# Patient Record
Sex: MALE | Race: WHITE | NOT HISPANIC OR LATINO | Employment: FULL TIME | URBAN - METROPOLITAN AREA
[De-identification: names, ages, dates, MRNs, and addresses within clinical notes are randomized per-mention and may not be internally consistent; named-entity substitution may affect disease eponyms.]

---

## 2019-11-02 ENCOUNTER — HOSPITAL ENCOUNTER (EMERGENCY)
Facility: HOSPITAL | Age: 54
Discharge: HOME | End: 2019-11-02
Attending: EMERGENCY MEDICINE | Admitting: EMERGENCY MEDICINE
Payer: COMMERCIAL

## 2019-11-02 VITALS
HEART RATE: 93 BPM | SYSTOLIC BLOOD PRESSURE: 157 MMHG | RESPIRATION RATE: 20 BRPM | DIASTOLIC BLOOD PRESSURE: 83 MMHG | TEMPERATURE: 97.3 F | WEIGHT: 226 LBS | OXYGEN SATURATION: 97 %

## 2019-11-02 DIAGNOSIS — S81.819A LEG LACERATION: Primary | ICD-10-CM

## 2019-11-02 PROCEDURE — 99282 EMERGENCY DEPT VISIT SF MDM: CPT

## 2019-11-02 PROCEDURE — 90715 TDAP VACCINE 7 YRS/> IM: CPT | Performed by: PHYSICIAN ASSISTANT

## 2019-11-02 PROCEDURE — 90471 IMMUNIZATION ADMIN: CPT

## 2019-11-02 RX ORDER — LIDOCAINE HYDROCHLORIDE AND EPINEPHRINE 10; 10 MG/ML; UG/ML
1 INJECTION, SOLUTION INFILTRATION; PERINEURAL ONCE
Status: COMPLETED | OUTPATIENT
Start: 2019-11-02 | End: 2019-11-02

## 2019-11-02 RX ADMIN — TETANUS TOXOID, REDUCED DIPHTHERIA TOXOID AND ACELLULAR PERTUSSIS VACCINE, ADSORBED 0.5 ML: 5; 2.5; 8; 8; 2.5 SUSPENSION INTRAMUSCULAR at 18:16

## 2019-11-02 RX ADMIN — LIDOCAINE HYDROCHLORIDE,EPINEPHRINE BITARTRATE 1 ML: 10; .01 INJECTION, SOLUTION INFILTRATION; PERINEURAL at 18:16

## 2019-11-02 NOTE — ED PROVIDER NOTES
History  Chief Complaint   Patient presents with    Extremity Laceration     cut to L leg on a issac tomatoe cage in his yard, thinks he needs a tetanus shot     The patient is a 71-year-old male who presents for evaluation left lower leg laceration  He states that he was in his garden and tripped over a issac tomato cage  Sustained a laceration to the leg  Is unsure of his tetanus vaccination status  Presents for evaluation and wound management  None       Past Medical History:   Diagnosis Date    Umbilical hernia        History reviewed  No pertinent surgical history  History reviewed  No pertinent family history  I have reviewed and agree with the history as documented  Social History     Tobacco Use    Smoking status: Never Smoker    Smokeless tobacco: Never Used   Substance Use Topics    Alcohol use: Yes     Comment: social 1-2 drinks a week    Drug use: Never        Review of Systems   Constitutional: Negative for activity change, appetite change and fatigue  HENT: Negative for nosebleeds, sneezing, sore throat, trouble swallowing and voice change  Eyes: Negative for photophobia, pain and visual disturbance  Respiratory: Negative for apnea, choking and stridor  Cardiovascular: Negative for palpitations and leg swelling  Gastrointestinal: Negative for anal bleeding and constipation  Endocrine: Negative for cold intolerance, heat intolerance, polydipsia and polyphagia  Genitourinary: Negative for decreased urine volume, enuresis, frequency, genital sores and urgency  Musculoskeletal: Negative for joint swelling and myalgias  Allergic/Immunologic: Negative for environmental allergies and food allergies  Neurological: Negative for tremors, seizures, speech difficulty and weakness  Hematological: Negative for adenopathy  Psychiatric/Behavioral: Negative for behavioral problems, decreased concentration, dysphoric mood and hallucinations     All other systems reviewed and are negative  Physical Exam  Physical Exam   Constitutional: He is oriented to person, place, and time  He appears well-developed and well-nourished  No distress  HENT:   Head: Normocephalic and atraumatic  Right Ear: External ear normal    Left Ear: External ear normal    Nose: Nose normal    Mouth/Throat: Oropharynx is clear and moist    Eyes: Pupils are equal, round, and reactive to light  Conjunctivae and EOM are normal    Neck: Normal range of motion  Neck supple  Cardiovascular: Normal rate, regular rhythm and normal heart sounds  Exam reveals no gallop and no friction rub  No murmur heard  Pulmonary/Chest: Effort normal and breath sounds normal  No respiratory distress  He has no wheezes  Abdominal: Soft  Bowel sounds are normal    Musculoskeletal: He exhibits tenderness  Legs:  Neurological: He is alert and oriented to person, place, and time  Skin: Skin is warm and dry  He is not diaphoretic  Psychiatric: He has a normal mood and affect  His behavior is normal    Vitals reviewed        Vital Signs  ED Triage Vitals [11/02/19 1747]   Temperature Pulse Respirations Blood Pressure SpO2   (!) 97 3 °F (36 3 °C) 93 20 157/83 97 %      Temp Source Heart Rate Source Patient Position - Orthostatic VS BP Location FiO2 (%)   Tympanic Monitor Sitting Right arm --      Pain Score       No Pain           Vitals:    11/02/19 1747   BP: 157/83   Pulse: 93   Patient Position - Orthostatic VS: Sitting         Visual Acuity      ED Medications  Medications   lidocaine-epinephrine (XYLOCAINE/EPINEPHRINE) 1 %-1:100,000 injection 1 mL (1 mL Infiltration Given 11/2/19 1816)   tetanus-diphtheria-acellular pertussis (BOOSTRIX) IM injection 0 5 mL (0 5 mL Intramuscular Given 11/2/19 1816)       Diagnostic Studies  Results Reviewed     None                 No orders to display              Procedures  Laceration repair  Date/Time: 11/2/2019 6:41 PM  Performed by: Ivet Reyes PA-C  Authorized by: Flower Rosario PA-C   Consent given by: patient  Patient identity confirmed: verbally with patient  Laceration length: 4 cm  Foreign bodies: no foreign bodies  Anesthesia: local infiltration    Anesthesia:  Local Anesthetic: lidocaine 1% with epinephrine    Wound Dehiscence:  Superficial Wound Dehiscence: simple closure      Procedure Details:  Irrigation solution: saline  Skin closure: 5-0 nylon  Number of sutures: 7  Technique: simple  Approximation: close  Approximation difficulty: simple  Patient tolerance: Patient tolerated the procedure well with no immediate complications             ED Course                               MDM    Disposition  Final diagnoses:   Leg laceration     Time reflects when diagnosis was documented in both MDM as applicable and the Disposition within this note     Time User Action Codes Description Comment    11/2/2019  6:41 PM Faizan Atkinson Add [[id number] 811E] Leg laceration       ED Disposition     ED Disposition Condition Date/Time Comment    Discharge Stable Sat Nov 2, 2019  6:41 PM Tamanna Wayne discharge to home/self care  Follow-up Information    None         Patient's Medications    No medications on file     No discharge procedures on file      ED Provider  Electronically Signed by           Flower Rosario PA-C  11/02/19 7943

## 2019-11-02 NOTE — ED NOTES
Pt wound irrigated with betadine and saline  Dabbed dry with gauze       Nathaly Kam RN  11/02/19 8861

## 2020-07-08 ENCOUNTER — HOSPITAL ENCOUNTER (OUTPATIENT)
Facility: HOSPITAL | Age: 55
Setting detail: OBSERVATION
Discharge: HOME | End: 2020-07-09
Attending: EMERGENCY MEDICINE | Admitting: INTERNAL MEDICINE
Payer: COMMERCIAL

## 2020-07-08 ENCOUNTER — APPOINTMENT (EMERGENCY)
Dept: RADIOLOGY | Facility: HOSPITAL | Age: 55
End: 2020-07-08
Payer: COMMERCIAL

## 2020-07-08 DIAGNOSIS — N20.0 KIDNEY STONE: Primary | ICD-10-CM

## 2020-07-08 DIAGNOSIS — N20.0 RIGHT NEPHROLITHIASIS: ICD-10-CM

## 2020-07-08 PROBLEM — R16.1 SPLENOMEGALY: Status: ACTIVE | Noted: 2019-09-24

## 2020-07-08 PROBLEM — K75.81 NONALCOHOLIC STEATOHEPATITIS: Status: ACTIVE | Noted: 2019-09-24

## 2020-07-08 PROBLEM — D69.6 THROMBOCYTOPENIC DISORDER (HCC): Status: ACTIVE | Noted: 2019-09-24

## 2020-07-08 PROBLEM — R31.9 HEMATURIA: Status: ACTIVE | Noted: 2020-07-08

## 2020-07-08 PROBLEM — I10 ESSENTIAL HYPERTENSION: Status: ACTIVE | Noted: 2020-07-08

## 2020-07-08 LAB
ALBUMIN SERPL BCP-MCNC: 3.9 G/DL (ref 3.5–5)
ALP SERPL-CCNC: 77 U/L (ref 46–116)
ALT SERPL W P-5'-P-CCNC: 39 U/L (ref 12–78)
ANION GAP SERPL CALCULATED.3IONS-SCNC: 8 MMOL/L (ref 4–13)
APTT PPP: 29 SECONDS (ref 23–37)
AST SERPL W P-5'-P-CCNC: 22 U/L (ref 5–45)
BACTERIA UR QL AUTO: ABNORMAL /HPF
BASOPHILS # BLD AUTO: 0.06 THOUSANDS/ΜL (ref 0–0.1)
BASOPHILS NFR BLD AUTO: 1 % (ref 0–1)
BILIRUB SERPL-MCNC: 0.5 MG/DL (ref 0.2–1)
BILIRUB UR QL STRIP: NEGATIVE
BUN SERPL-MCNC: 17 MG/DL (ref 5–25)
CALCIUM SERPL-MCNC: 9.2 MG/DL (ref 8.3–10.1)
CHLORIDE SERPL-SCNC: 102 MMOL/L (ref 100–108)
CLARITY UR: CLEAR
CO2 SERPL-SCNC: 28 MMOL/L (ref 21–32)
COLOR UR: ABNORMAL
CREAT SERPL-MCNC: 1.16 MG/DL (ref 0.6–1.3)
EOSINOPHIL # BLD AUTO: 0.13 THOUSAND/ΜL (ref 0–0.61)
EOSINOPHIL NFR BLD AUTO: 1 % (ref 0–6)
ERYTHROCYTE [DISTWIDTH] IN BLOOD BY AUTOMATED COUNT: 14.1 % (ref 11.6–15.1)
GFR SERPL CREATININE-BSD FRML MDRD: 70 ML/MIN/1.73SQ M
GLUCOSE SERPL-MCNC: 116 MG/DL (ref 65–140)
GLUCOSE UR STRIP-MCNC: NEGATIVE MG/DL
HCT VFR BLD AUTO: 46.1 % (ref 36.5–49.3)
HGB BLD-MCNC: 14.9 G/DL (ref 12–17)
HGB UR QL STRIP.AUTO: ABNORMAL
IMM GRANULOCYTES # BLD AUTO: 0.04 THOUSAND/UL (ref 0–0.2)
IMM GRANULOCYTES NFR BLD AUTO: 0 % (ref 0–2)
INR PPP: 0.95 (ref 0.84–1.19)
KETONES UR STRIP-MCNC: NEGATIVE MG/DL
LEUKOCYTE ESTERASE UR QL STRIP: NEGATIVE
LIPASE SERPL-CCNC: 189 U/L (ref 73–393)
LYMPHOCYTES # BLD AUTO: 0.9 THOUSANDS/ΜL (ref 0.6–4.47)
LYMPHOCYTES NFR BLD AUTO: 9 % (ref 14–44)
MCH RBC QN AUTO: 27.1 PG (ref 26.8–34.3)
MCHC RBC AUTO-ENTMCNC: 32.3 G/DL (ref 31.4–37.4)
MCV RBC AUTO: 84 FL (ref 82–98)
MONOCYTES # BLD AUTO: 0.73 THOUSAND/ΜL (ref 0.17–1.22)
MONOCYTES NFR BLD AUTO: 8 % (ref 4–12)
MUCOUS THREADS UR QL AUTO: ABNORMAL
NEUTROPHILS # BLD AUTO: 7.88 THOUSANDS/ΜL (ref 1.85–7.62)
NEUTS SEG NFR BLD AUTO: 81 % (ref 43–75)
NITRITE UR QL STRIP: NEGATIVE
NON-SQ EPI CELLS URNS QL MICRO: ABNORMAL /HPF
NRBC BLD AUTO-RTO: 0 /100 WBCS
PH UR STRIP.AUTO: 5.5 [PH]
PLATELET # BLD AUTO: 148 THOUSANDS/UL (ref 149–390)
PMV BLD AUTO: 12 FL (ref 8.9–12.7)
POTASSIUM SERPL-SCNC: 4 MMOL/L (ref 3.5–5.3)
PROT SERPL-MCNC: 7.7 G/DL (ref 6.4–8.2)
PROT UR STRIP-MCNC: NEGATIVE MG/DL
PROTHROMBIN TIME: 12.6 SECONDS (ref 11.6–14.5)
RBC # BLD AUTO: 5.49 MILLION/UL (ref 3.88–5.62)
RBC #/AREA URNS AUTO: ABNORMAL /HPF
SARS-COV-2 RNA RESP QL NAA+PROBE: NEGATIVE
SODIUM SERPL-SCNC: 138 MMOL/L (ref 136–145)
SP GR UR STRIP.AUTO: 1.02 (ref 1–1.03)
UROBILINOGEN UR QL STRIP.AUTO: 0.2 E.U./DL
WBC # BLD AUTO: 9.74 THOUSAND/UL (ref 4.31–10.16)
WBC #/AREA URNS AUTO: ABNORMAL /HPF

## 2020-07-08 PROCEDURE — 87635 SARS-COV-2 COVID-19 AMP PRB: CPT | Performed by: EMERGENCY MEDICINE

## 2020-07-08 PROCEDURE — 83690 ASSAY OF LIPASE: CPT | Performed by: EMERGENCY MEDICINE

## 2020-07-08 PROCEDURE — 74176 CT ABD & PELVIS W/O CONTRAST: CPT

## 2020-07-08 PROCEDURE — 99285 EMERGENCY DEPT VISIT HI MDM: CPT

## 2020-07-08 PROCEDURE — 96375 TX/PRO/DX INJ NEW DRUG ADDON: CPT

## 2020-07-08 PROCEDURE — 99285 EMERGENCY DEPT VISIT HI MDM: CPT | Performed by: EMERGENCY MEDICINE

## 2020-07-08 PROCEDURE — 81001 URINALYSIS AUTO W/SCOPE: CPT | Performed by: EMERGENCY MEDICINE

## 2020-07-08 PROCEDURE — 36415 COLL VENOUS BLD VENIPUNCTURE: CPT | Performed by: EMERGENCY MEDICINE

## 2020-07-08 PROCEDURE — 80053 COMPREHEN METABOLIC PANEL: CPT | Performed by: EMERGENCY MEDICINE

## 2020-07-08 PROCEDURE — 96361 HYDRATE IV INFUSION ADD-ON: CPT

## 2020-07-08 PROCEDURE — 85025 COMPLETE CBC W/AUTO DIFF WBC: CPT | Performed by: EMERGENCY MEDICINE

## 2020-07-08 PROCEDURE — 85730 THROMBOPLASTIN TIME PARTIAL: CPT | Performed by: EMERGENCY MEDICINE

## 2020-07-08 PROCEDURE — 96374 THER/PROPH/DIAG INJ IV PUSH: CPT

## 2020-07-08 PROCEDURE — 99220 PR INITIAL OBSERVATION CARE/DAY 70 MINUTES: CPT | Performed by: PHYSICIAN ASSISTANT

## 2020-07-08 PROCEDURE — 85610 PROTHROMBIN TIME: CPT | Performed by: EMERGENCY MEDICINE

## 2020-07-08 RX ORDER — VALSARTAN 320 MG/1
320 TABLET ORAL
Status: ON HOLD | COMMUNITY
End: 2020-07-23

## 2020-07-08 RX ORDER — PYRIDOXINE HCL (VITAMIN B6) 50 MG
50 TABLET ORAL DAILY
COMMUNITY
End: 2021-07-06

## 2020-07-08 RX ORDER — PREDNISONE 10 MG/1
15 TABLET ORAL DAILY
COMMUNITY
End: 2021-03-10

## 2020-07-08 RX ORDER — ONDANSETRON 2 MG/ML
4 INJECTION INTRAMUSCULAR; INTRAVENOUS ONCE
Status: COMPLETED | OUTPATIENT
Start: 2020-07-08 | End: 2020-07-08

## 2020-07-08 RX ORDER — KETOROLAC TROMETHAMINE 30 MG/ML
30 INJECTION, SOLUTION INTRAMUSCULAR; INTRAVENOUS ONCE
Status: COMPLETED | OUTPATIENT
Start: 2020-07-08 | End: 2020-07-08

## 2020-07-08 RX ADMIN — ONDANSETRON 4 MG: 2 INJECTION INTRAMUSCULAR; INTRAVENOUS at 20:17

## 2020-07-08 RX ADMIN — SODIUM CHLORIDE 1000 ML: 0.9 INJECTION, SOLUTION INTRAVENOUS at 20:17

## 2020-07-08 RX ADMIN — KETOROLAC TROMETHAMINE 30 MG: 30 INJECTION, SOLUTION INTRAMUSCULAR at 20:17

## 2020-07-09 ENCOUNTER — ANESTHESIA EVENT (OUTPATIENT)
Dept: PERIOP | Facility: HOSPITAL | Age: 55
End: 2020-07-09
Payer: COMMERCIAL

## 2020-07-09 ENCOUNTER — ANESTHESIA (OUTPATIENT)
Dept: PERIOP | Facility: HOSPITAL | Age: 55
End: 2020-07-09
Payer: COMMERCIAL

## 2020-07-09 ENCOUNTER — APPOINTMENT (OUTPATIENT)
Dept: RADIOLOGY | Facility: HOSPITAL | Age: 55
End: 2020-07-09
Payer: COMMERCIAL

## 2020-07-09 VITALS
BODY MASS INDEX: 29.42 KG/M2 | WEIGHT: 222 LBS | SYSTOLIC BLOOD PRESSURE: 121 MMHG | HEIGHT: 73 IN | DIASTOLIC BLOOD PRESSURE: 76 MMHG | TEMPERATURE: 97.3 F | RESPIRATION RATE: 16 BRPM | OXYGEN SATURATION: 95 % | HEART RATE: 60 BPM

## 2020-07-09 PROBLEM — M19.90 INFLAMMATORY ARTHRITIS: Status: ACTIVE | Noted: 2020-07-09

## 2020-07-09 PROBLEM — R31.9 HEMATURIA: Status: RESOLVED | Noted: 2020-07-08 | Resolved: 2020-07-09

## 2020-07-09 PROCEDURE — C1769 GUIDE WIRE: HCPCS | Performed by: SPECIALIST

## 2020-07-09 PROCEDURE — 74420 UROGRAPHY RTRGR +-KUB: CPT

## 2020-07-09 PROCEDURE — C2617 STENT, NON-COR, TEM W/O DEL: HCPCS | Performed by: SPECIALIST

## 2020-07-09 DEVICE — INLAY URETERAL STENT W/O GUIDEWIRE
Type: IMPLANTABLE DEVICE | Site: URETER | Status: FUNCTIONAL
Brand: BARD® INLAY® URETERAL STENT

## 2020-07-09 RX ORDER — MAGNESIUM HYDROXIDE 1200 MG/15ML
LIQUID ORAL AS NEEDED
Status: DISCONTINUED | OUTPATIENT
Start: 2020-07-09 | End: 2020-07-09 | Stop reason: HOSPADM

## 2020-07-09 RX ORDER — KETOROLAC TROMETHAMINE 30 MG/ML
15 INJECTION, SOLUTION INTRAMUSCULAR; INTRAVENOUS EVERY 6 HOURS PRN
Status: DISCONTINUED | OUTPATIENT
Start: 2020-07-09 | End: 2020-07-09 | Stop reason: HOSPADM

## 2020-07-09 RX ORDER — ACETAMINOPHEN 325 MG/1
650 TABLET ORAL EVERY 6 HOURS PRN
Status: DISCONTINUED | OUTPATIENT
Start: 2020-07-09 | End: 2020-07-09 | Stop reason: HOSPADM

## 2020-07-09 RX ORDER — PROPOFOL 10 MG/ML
INJECTION, EMULSION INTRAVENOUS CONTINUOUS PRN
Status: DISCONTINUED | OUTPATIENT
Start: 2020-07-09 | End: 2020-07-09 | Stop reason: SURG

## 2020-07-09 RX ORDER — ONDANSETRON 2 MG/ML
4 INJECTION INTRAMUSCULAR; INTRAVENOUS EVERY 6 HOURS PRN
Status: DISCONTINUED | OUTPATIENT
Start: 2020-07-09 | End: 2020-07-09 | Stop reason: HOSPADM

## 2020-07-09 RX ORDER — PROPOFOL 10 MG/ML
INJECTION, EMULSION INTRAVENOUS AS NEEDED
Status: DISCONTINUED | OUTPATIENT
Start: 2020-07-09 | End: 2020-07-09 | Stop reason: SURG

## 2020-07-09 RX ORDER — CALCIUM CARBONATE 200(500)MG
1000 TABLET,CHEWABLE ORAL DAILY PRN
Status: DISCONTINUED | OUTPATIENT
Start: 2020-07-09 | End: 2020-07-09 | Stop reason: HOSPADM

## 2020-07-09 RX ORDER — SODIUM CHLORIDE, SODIUM LACTATE, POTASSIUM CHLORIDE, CALCIUM CHLORIDE 600; 310; 30; 20 MG/100ML; MG/100ML; MG/100ML; MG/100ML
INJECTION, SOLUTION INTRAVENOUS CONTINUOUS PRN
Status: DISCONTINUED | OUTPATIENT
Start: 2020-07-09 | End: 2020-07-09 | Stop reason: SURG

## 2020-07-09 RX ORDER — MIDAZOLAM HYDROCHLORIDE 2 MG/2ML
INJECTION, SOLUTION INTRAMUSCULAR; INTRAVENOUS AS NEEDED
Status: DISCONTINUED | OUTPATIENT
Start: 2020-07-09 | End: 2020-07-09 | Stop reason: SURG

## 2020-07-09 RX ORDER — ONDANSETRON 2 MG/ML
INJECTION INTRAMUSCULAR; INTRAVENOUS AS NEEDED
Status: DISCONTINUED | OUTPATIENT
Start: 2020-07-09 | End: 2020-07-09 | Stop reason: SURG

## 2020-07-09 RX ORDER — KETOROLAC TROMETHAMINE 10 MG/1
10 TABLET, FILM COATED ORAL EVERY 6 HOURS PRN
Qty: 20 TABLET | Refills: 0
Start: 2020-07-09 | End: 2021-03-10

## 2020-07-09 RX ORDER — LEVOFLOXACIN 5 MG/ML
500 INJECTION, SOLUTION INTRAVENOUS ONCE
Status: DISCONTINUED | OUTPATIENT
Start: 2020-07-09 | End: 2020-07-09 | Stop reason: HOSPADM

## 2020-07-09 RX ORDER — KETOROLAC TROMETHAMINE 30 MG/ML
INJECTION, SOLUTION INTRAMUSCULAR; INTRAVENOUS AS NEEDED
Status: DISCONTINUED | OUTPATIENT
Start: 2020-07-09 | End: 2020-07-09 | Stop reason: SURG

## 2020-07-09 RX ORDER — LIDOCAINE HYDROCHLORIDE 10 MG/ML
INJECTION, SOLUTION EPIDURAL; INFILTRATION; INTRACAUDAL; PERINEURAL AS NEEDED
Status: DISCONTINUED | OUTPATIENT
Start: 2020-07-09 | End: 2020-07-09 | Stop reason: SURG

## 2020-07-09 RX ORDER — SODIUM CHLORIDE 9 MG/ML
100 INJECTION, SOLUTION INTRAVENOUS CONTINUOUS
Status: DISCONTINUED | OUTPATIENT
Start: 2020-07-09 | End: 2020-07-09 | Stop reason: HOSPADM

## 2020-07-09 RX ORDER — LOSARTAN POTASSIUM 50 MG/1
100 TABLET ORAL DAILY
Status: DISCONTINUED | OUTPATIENT
Start: 2020-07-09 | End: 2020-07-09 | Stop reason: HOSPADM

## 2020-07-09 RX ORDER — FENTANYL CITRATE 50 UG/ML
INJECTION, SOLUTION INTRAMUSCULAR; INTRAVENOUS AS NEEDED
Status: DISCONTINUED | OUTPATIENT
Start: 2020-07-09 | End: 2020-07-09 | Stop reason: SURG

## 2020-07-09 RX ORDER — PYRIDOXINE HCL (VITAMIN B6) 50 MG
50 TABLET ORAL DAILY
Status: DISCONTINUED | OUTPATIENT
Start: 2020-07-09 | End: 2020-07-09 | Stop reason: HOSPADM

## 2020-07-09 RX ORDER — TAMSULOSIN HYDROCHLORIDE 0.4 MG/1
0.4 CAPSULE ORAL
Status: DISCONTINUED | OUTPATIENT
Start: 2020-07-09 | End: 2020-07-09 | Stop reason: HOSPADM

## 2020-07-09 RX ORDER — DEXAMETHASONE SODIUM PHOSPHATE 10 MG/ML
INJECTION, SOLUTION INTRAMUSCULAR; INTRAVENOUS AS NEEDED
Status: DISCONTINUED | OUTPATIENT
Start: 2020-07-09 | End: 2020-07-09 | Stop reason: SURG

## 2020-07-09 RX ADMIN — TAMSULOSIN HYDROCHLORIDE 0.4 MG: 0.4 CAPSULE ORAL at 01:19

## 2020-07-09 RX ADMIN — SODIUM CHLORIDE 100 ML/HR: 0.9 INJECTION, SOLUTION INTRAVENOUS at 01:19

## 2020-07-09 RX ADMIN — LIDOCAINE HYDROCHLORIDE 50 MG: 10 INJECTION, SOLUTION EPIDURAL; INFILTRATION; INTRACAUDAL; PERINEURAL at 12:49

## 2020-07-09 RX ADMIN — ONDANSETRON 4 MG: 2 INJECTION INTRAMUSCULAR; INTRAVENOUS at 12:56

## 2020-07-09 RX ADMIN — ONDANSETRON 4 MG: 2 INJECTION INTRAMUSCULAR; INTRAVENOUS at 09:35

## 2020-07-09 RX ADMIN — LEVOFLOXACIN 500 MG: 5 INJECTION, SOLUTION INTRAVENOUS at 12:43

## 2020-07-09 RX ADMIN — PROPOFOL 120 MCG/KG/MIN: 10 INJECTION, EMULSION INTRAVENOUS at 12:52

## 2020-07-09 RX ADMIN — Medication 50 MG: at 09:35

## 2020-07-09 RX ADMIN — KETOROLAC TROMETHAMINE 30 MG: 30 INJECTION, SOLUTION INTRAMUSCULAR at 13:12

## 2020-07-09 RX ADMIN — KETOROLAC TROMETHAMINE 15 MG: 30 INJECTION, SOLUTION INTRAMUSCULAR at 09:35

## 2020-07-09 RX ADMIN — DEXAMETHASONE SODIUM PHOSPHATE 8 MG: 10 INJECTION, SOLUTION INTRAMUSCULAR; INTRAVENOUS at 12:54

## 2020-07-09 RX ADMIN — TAMSULOSIN HYDROCHLORIDE 0.4 MG: 0.4 CAPSULE ORAL at 16:12

## 2020-07-09 RX ADMIN — PROPOFOL 150 MG: 10 INJECTION, EMULSION INTRAVENOUS at 12:49

## 2020-07-09 RX ADMIN — MIDAZOLAM HYDROCHLORIDE 2 MG: 1 INJECTION, SOLUTION INTRAMUSCULAR; INTRAVENOUS at 12:41

## 2020-07-09 RX ADMIN — SODIUM CHLORIDE 100 ML/HR: 0.9 INJECTION, SOLUTION INTRAVENOUS at 14:50

## 2020-07-09 RX ADMIN — FENTANYL CITRATE 25 MCG: 50 INJECTION, SOLUTION INTRAMUSCULAR; INTRAVENOUS at 12:48

## 2020-07-09 RX ADMIN — SODIUM CHLORIDE, SODIUM LACTATE, POTASSIUM CHLORIDE, AND CALCIUM CHLORIDE: .6; .31; .03; .02 INJECTION, SOLUTION INTRAVENOUS at 12:43

## 2020-07-09 RX ADMIN — FENTANYL CITRATE 25 MCG: 50 INJECTION, SOLUTION INTRAMUSCULAR; INTRAVENOUS at 12:56

## 2020-07-09 NOTE — ASSESSMENT & PLAN NOTE
Patient states that he has had on and off episodes of hematuria since flank pain started approximately 10 days ago  He states that the last episode of hematuria he had was on Saturday  He has not noticed gross hematuria since then    - plan as above for right nephrolithiasis  - urology consult

## 2020-07-09 NOTE — ASSESSMENT & PLAN NOTE
History of thrombocytopenia, non alcoholic steatohepatitis, splenomegaly  Patient states that his platelet count trends in low 100s normally  On and off episodes of hematuria, no other signs of bleeding/bruising  Continue to monitor  Follow-up outpatient with PCP

## 2020-07-09 NOTE — ASSESSMENT & PLAN NOTE
History of thrombocytopenia, non alcoholic steatohepatitis, splenomegaly  Patient states that his platelet count trends in low 100s normally  Currently on the rise     On and off episodes of hematuria, no other signs of bleeding/bruising  Continue to monitor  Follow-up outpatient with PCP

## 2020-07-09 NOTE — DISCHARGE SUMMARY
Discharge- Mary Jin 1965, 53 y o  male MRN: [id number]    Unit/Bed#: [address] [place] [address] A Encounter: [id number]    Primary Care Provider: No primary care provider on file  Date and time admitted to hospital: 7/8/2020  7:47 PM        * Right nephrolithiasis  Assessment & Plan  Patient reports experiencing right flank/groin pain approximately 10 days ago  It is been on and off since then, but started with severe right flank pain today while he was at work  He began to feel nauseous, had 1 episode vomiting at home before coming into the ED  He does have a history of kidney stones in the past, and states he passed all of the them without intervention  He noticed hematuria once or twice since the pain started 10 days ago, most recent episode of hematuria on Saturday  A CT done in 9/2019 noted an 8 mm right renal calculus without hydronephrosis or hydroureter  · CT showed 8mm calculus within the R ureteropelvic junction with mild hydronephrosis, retrograde pyelogram identified a 10 mm stone and stent was placed   Patient will follow up in one week for stent removal with Dr Jose Vieyra  · Very minimal pain at discharge , still passing tea colored   Patient instructed to call the office if unable to urinate or increasing gross hematuria    Hematuriaresolved as of 7/9/2020  Assessment & Plan  Patient states that he has had on and off episodes of hematuria since flank pain started approximately 10 days ago  He states that the last episode of hematuria he had was on Saturday  He has not noticed gross hematuria since then  Tea colored urine  Now status post stent placement  F/U with Dr Jose Vieyra in one week  Essential hypertension  Assessment & Plan  Resume valsartan  Inflammatory arthritis  Assessment & Plan  Patient may resume prednisone and f/u with rheumatology and PCP as out patient    Would have low threshold to give Abx due to being on steroids and noted splenomegaly ( could be at risk for functional aspleenia, issue making him susceptible to encapsulated infections)    Thrombocytopenic disorder (HCC)  Assessment & Plan  History of thrombocytopenia, non alcoholic steatohepatitis, splenomegaly  Patient states that his platelet count trends in low 100s normally  Currently on the rise  On and off episodes of hematuria, no other signs of bleeding/bruising  Continue to monitor  Follow-up outpatient with PCP          Discharging Physician / Practitioner: Kiran Pedro MD  PCP: No primary care provider on file  Admission Date:   Admission Orders (From admission, onward)     Ordered        07/08/20 2221  Place in Observation  Once                   Discharge Date: 07/09/20    Resolved Problems  Date Reviewed: 7/9/2020          Resolved    Hematuria 7/9/2020     Resolved by  Kiran Pedro MD          Consultations During Hospital Stay:  · Urology    Procedures Performed:   · Retrograde pyelogram with stent placement  · CT abdomen renal stone protocol  8 mm right ureteropelvic junction calculus resulting in mild hydronephrosis    Significant Findings / Test Results:   · As above  · Discharging creatinine 1 16  · Discharging white count 9 74  · Discharging hemoglobin 14 9    Incidental Findings:   · None    Test Results Pending at Discharge (will require follow up): · None     Outpatient Tests Requested:  · None    Complications:  None    Reason for Admission:  Right flank pain    Hospital Course:     Joy Gupta is a 53 y o  male patient who originally presented to the hospital on 7/8/2020 due to recurrent right flank pain  Patient reports that 10 days ago he had significant flank pain with noted hematuria  The symptoms seemed to improve however yesterday he developed again worsening dark colored urine and flank pain  CT scan showed an 8 mm stone  Patient was taken to the OR on the day of admission and underwent retrograde pyelogram with stent placement  Patient shows no sign of acute infection  There should be a low threshold however to treat this patient with further antibiotics since he does have splenomegaly with underlying inflammatory arthritis which could put him at risk for functional asplenia  Patient received Levaquin during hospital stay but does not require Levaquin at discharge  Patient will follow-up in the outpatient setting with Urology for stent removal   Patient to resume his prednisone, and he has been instructed to report any bright red blood in the urine or any continued darker urine with difficulty urinating  Patient is stable for discharge from his observation status status post retropubic pyelogram    Please see above list of diagnoses and related plan for additional information  Condition at Discharge: stable     Discharge Day Visit / Exam:     Subjective:  Patient states no significant flank pain at this time some discomfort at the site of cystoscopy  He has been able to void x2    No nausea or vomiting or diarrhea  Vitals: Blood Pressure: 121/76 (07/09/20 1533)  Pulse: 60 (07/09/20 1533)  Temperature: (!) 97 3 °F (36 3 °C) (07/09/20 1533)  Temp Source: Oral (07/09/20 0746)  Respirations: 16 (07/09/20 1533)  Height: 6' 1" (185 4 cm) (07/09/20 0021)  Weight - Scale: 101 kg (222 lb 0 1 oz) (07/09/20 0021)  SpO2: 95 % (07/09/20 1533)  Exam:   Physical Exam  General well-developed reasonably nourished male no acute distress normocephalic atraumatic pupils equal round reactive to light extraocular muscles intact mucous membranes are moist neck is supple there is no JVD no lymph nodes no carotid bruits chest is decreased but clear to auscultation is no rhonchi rales or wheezes  Cardiovascular regular rate rhythm positive S1 and S2 heard appreciate an S3-S4 murmur or gallop  Abdomen is soft he is passing tea-colored urine extremities no edema  Discussion with Family:  Updated spouse at the bedside    Discharge instructions/Information to patient and family:   See after visit summary for information provided to patient and family  Provisions for Follow-Up Care:  See after visit summary for information related to follow-up care and any pertinent home health orders  Disposition:     Home    For Discharges to [facility]:   · Not Applicable to this Patient - Not Applicable to this Patient    Planned Readmission:  None     Discharge Statement:  I spent 40 minutes discharging the patient  Discussed case with Urology saw the patient this a m  Preprocedure and checked on postprocedure this afternoon  This time was spent on the day of discharge  I had direct contact with the patient on the day of discharge  Greater than 50% of the total time was spent examining patient, answering all patient questions, arranging and discussing plan of care with patient as well as directly providing post-discharge instructions  Additional time then spent on discharge activities  Discharge Medications:  See after visit summary for reconciled discharge medications provided to patient and family        ** Please Note: This note has been constructed using a voice recognition system **

## 2020-07-09 NOTE — ASSESSMENT & PLAN NOTE
Patient states that he has had on and off episodes of hematuria since flank pain started approximately 10 days ago  He states that the last episode of hematuria he had was on Saturday  He has not noticed gross hematuria since then  Tea colored urine  Now status post stent placement  F/U with Dr Ksenia Cunningham in one week

## 2020-07-09 NOTE — H&P
[facility]  H&P- Anibal South 1965, 53 y o  male MRN: [id number]  Unit/Bed#: ED 07 Encounter: [id number]  Primary Care Provider: No primary care provider on file  Date and time admitted to hospital: 7/8/2020  7:47 PM    * Right nephrolithiasis  Assessment & Plan  Patient reports experiencing right flank/groin pain approximately 10 days ago  It is been on and off since then, but started with severe right flank pain today while he was at work  He began to feel nauseous, had 1 episode vomiting at home before coming into the ED  He does have a history of kidney stones in the past, and states he passed all of the them without intervention  He noticed hematuria once or twice since the pain started 10 days ago, most recent episode of hematuria on Saturday  · CT showed 8mm calculus within the R ureteropelvic junction with mild hydronephrosis  · Hx of kidney stones   · IVF  · NPO after midnight  · Flomax  · Strain all urine  · PRN pain management and antiemetics  · Urology consultation    Hematuria  Assessment & Plan  Patient states that he has had on and off episodes of hematuria since flank pain started approximately 10 days ago  He states that the last episode of hematuria he had was on Saturday  He has not noticed gross hematuria since then  - plan as above for right nephrolithiasis  - urology consult    Essential hypertension  Assessment & Plan  Home dose of valsartan switched for hospital formulary losartan 100 mg daily      VTE Prophylaxis: Pharmacologic VTE Prophylaxis contraindicated due to Hematuria  / sequential compression device   Code Status:  Level 1  POLST: POLST is not applicable to this patient  Discussion with family:  Discussed with patient's wife present in the ED    Anticipated Length of Stay:  Patient will be admitted on an Observation basis with an anticipated length of stay of  < 2 midnights     Justification for Hospital Stay:  Right kidney stone with intractable pain, nausea requiring IVFs, IV pain control    Total Time for Visit, including Counseling / Coordination of Care: 30 minutes  Greater than 50% of this total time spent on direct patient counseling and coordination of care  Chief Complaint:   Right flank pain    History of Present Illness:    Sivan Crum is a 53 y o  male who presents with PMH of HTN  He presented to the ED today for recurrent and severe right flank pain, with nausea and 1 episode of vomiting tonight  The patient states that he initially experienced right flank/groin pain approximately 10 days ago, and it would come and go in waves  He states that it went away for a couple of days, but comes back, and has primarily just been nonradiating right flank pain since then  He has had a few episodes of hematuria, but not constantly since the pain started  The last time he noticed hematuria was on Saturday, but has not noticed any blood in his urine since then  He states that this afternoon the pain became severe, and he felt nauseous  He states that when he got home from work he had 1 episode of vomiting, and the pain was so severe at that time that he decided to come into the ER  He states he has a history of kidney stones, which he reports passing on his own in the past   A CT completed in September of 2019 did show a right renal calculus, without any hydronephrosis/hydroureter  Today in the ED a CT renal stone study showed the same 8 mm stone in the ureteropelvic junction with mild hydronephrosis  Patient's pain was well controlled with I Toradol in the ED  ED physician discussed with Dr Alex Wu, and plan is to bring patient in overnight for pain management and potentially procedure tomorrow  Review of Systems:    Review of Systems   Constitutional: Negative for chills, fatigue and fever  HENT: Negative for congestion, rhinorrhea and sore throat  Eyes: Negative for visual disturbance     Respiratory: Negative for cough, shortness of breath and wheezing  Cardiovascular: Negative for chest pain, palpitations and leg swelling  Gastrointestinal: Positive for nausea and vomiting  Negative for abdominal distention and abdominal pain  Genitourinary: Positive for flank pain and hematuria  Negative for decreased urine volume, difficulty urinating, dysuria, frequency and urgency  Musculoskeletal: Negative for gait problem and myalgias  Skin: Negative for color change and wound  Neurological: Negative for dizziness, weakness, light-headedness, numbness and headaches  Hematological: Does not bruise/bleed easily  Past Medical and Surgical History:     Past Medical History:   Diagnosis Date    Hypertension     Kidney stones     Umbilical hernia        History reviewed  No pertinent surgical history  Meds/Allergies:    Prior to Admission medications    Medication Sig Start Date End Date Taking? Authorizing Provider   predniSONE 10 mg tablet Take 15 mg by mouth daily   Yes Historical Provider, MD   pyridoxine (VITAMIN B6) 50 mg tablet Take 50 mg by mouth daily   Yes Historical Provider, MD   valsartan (DIOVAN) 320 MG tablet Take 320 mg by mouth daily   Yes Historical Provider, MD   Multiple Vitamin (VITAMIN E/FOLIC RLNN/Y-9/D-89 PO) Take by mouth daily    Historical Provider, MD     I have reviewed home medications with patient personally      Allergies: No Known Allergies    Social History:     Substance Use History:   Social History     Substance and Sexual Activity   Alcohol Use Yes    Frequency: 2-4 times a month    Drinks per session: 1 or 2    Comment: social 1-2 drinks a week at most, not consistently     Social History     Tobacco Use   Smoking Status Never Smoker   Smokeless Tobacco Never Used     Social History     Substance and Sexual Activity   Drug Use Never       Family History:    Family History   Problem Relation Age of Onset    No Known Problems Mother     Splenomegaly Father     Thrombocytopenia Father     Thrombocytopenia Brother        Physical Exam:     Vitals:   Blood Pressure: 134/84 (07/08/20 2230)  Pulse: 70 (07/08/20 2230)  Temperature: (!) 96 5 °F (35 8 °C) (07/08/20 1946)  Temp Source: Tympanic (07/08/20 1946)  Respirations: 20 (07/08/20 1946)  Weight - Scale: 101 kg (223 lb) (07/08/20 1946)  SpO2: 98 % (07/08/20 2230)    Physical Exam   Constitutional: He is oriented to person, place, and time  He appears well-developed and well-nourished  No distress  HENT:   Head: Normocephalic and atraumatic  Eyes: EOM are normal    Cardiovascular: Normal rate, regular rhythm, normal heart sounds and intact distal pulses  Exam reveals no friction rub  No murmur heard  Pulmonary/Chest: Effort normal and breath sounds normal  No respiratory distress  He has no wheezes  He has no rales  Abdominal: Soft  Bowel sounds are normal  He exhibits no distension  There is no tenderness  There is no guarding  Musculoskeletal: He exhibits no edema or tenderness  No CVA tenderness   Neurological: He is alert and oriented to person, place, and time  Skin: Skin is warm and dry  No rash noted  He is not diaphoretic  No erythema  Psychiatric: He has a normal mood and affect  His behavior is normal    Vitals reviewed  Additional Data:     Lab Results: I have personally reviewed pertinent reports        Results from last 7 days   Lab Units 07/08/20 2016   WBC Thousand/uL 9 74   HEMOGLOBIN g/dL 14 9   HEMATOCRIT % 46 1   PLATELETS Thousands/uL 148*   NEUTROS PCT % 81*   LYMPHS PCT % 9*   MONOS PCT % 8   EOS PCT % 1     Results from last 7 days   Lab Units 07/08/20 2016   SODIUM mmol/L 138   POTASSIUM mmol/L 4 0   CHLORIDE mmol/L 102   CO2 mmol/L 28   BUN mg/dL 17   CREATININE mg/dL 1 16   ANION GAP mmol/L 8   CALCIUM mg/dL 9 2   ALBUMIN g/dL 3 9   TOTAL BILIRUBIN mg/dL 0 50   ALK PHOS U/L 77   ALT U/L 39   AST U/L 22   GLUCOSE RANDOM mg/dL 116     Results from last 7 days   Lab Units 07/08/20 2016   INR  0 95 Imaging: I have personally reviewed pertinent reports  CT renal stone study abdomen pelvis without contrast   Final Result by Bhavik Crocker DO (07/08 2043)      8 mm right ureteropelvic junction calculus resulting in mild hydronephrosis  Workstation performed: [id number]             EKG, Pathology, and Other Studies Reviewed on Admission:   · EKG:  None    Allscripts / Epic Records Reviewed: Yes     ** Please Note: This note has been constructed using a voice recognition system   **

## 2020-07-09 NOTE — UTILIZATION REVIEW
Initial Clinical Review    Admission: Date/Time/Statement: Admission Orders (From admission, onward)     Ordered        07/08/20 2221  Place in Observation  Once                   Orders Placed This Encounter   Procedures    Place in Observation     Standing Status:   Standing     Number of Occurrences:   1     Order Specific Question:   Admitting Physician     Answer:   Manuela Peterson     Order Specific Question:   Level of Care     Answer:   Med Surg [16]     ED Arrival Information     Expected Arrival Acuity Means of Arrival Escorted By Service Admission Type    - 7/8/2020 19:41 Urgent Walk-In Spouse Hospitalist Urgent    Arrival Complaint    flank pain, vomiting        Chief Complaint   Patient presents with    Flank Pain     intermittent R flank pain and hematuria for over a week  pain much worse     · Assessment/Plan: 53 y o  male who presents with PMH of HTN,kidney stones   He presented to the ED today for recurrent and severe right flank pain, with nausea and 1 episode of vomiting tonight  The patient states that he initially experienced right flank/groin pain approximately 10 days ago, and it would come and go in waves  He states that it went away for a couple of days, but comes back, and has primarily just been nonradiating right flank pain since then  He has had a few episodes of hematuria, but not constantly since the pain started  The last time he noticed hematuria was on Saturday, but has not noticed any blood in his urine since then  He states that this afternoon the pain became severe, and he felt nauseous  Today in the ED a CT renal stone study showed the same 8 mm stone in the ureteropelvic junction with mild hydronephrosis  Patient's pain was well controlled with I Toradol in the ED  ED physician discussed with Dr Lisa Cobos, and plan is to bring patient in overnight for pain management and potentially procedure tomorrow  IVF  · NPO after midnight  · Flomax  · Strain all urine  · PRN pain management and antiemetics  Urology consultation  ED Triage Vitals [07/08/20 1946]   Temperature Pulse Respirations Blood Pressure SpO2   (!) 96 5 °F (35 8 °C) 76 20 (!) 171/92 98 %      Temp Source Heart Rate Source Patient Position - Orthostatic VS BP Location FiO2 (%)   Tympanic Monitor Standing Right arm --      Pain Score       9        Wt Readings from Last 1 Encounters:   07/09/20 101 kg (222 lb 0 1 oz)     Additional Vital Signs:   07/09/20 0746  98 °F (36 7 °C)  72  16  126/74    96 %       07/09/20 00:21:26  98 1 °F (36 7 °C)  70  18  127/73  91  96 %       07/08/20 2330    70    128/78  98  95 %       07/08/20 2230    70    134/84  102  98 %       07/08/20 2215    70        97 %         Pertinent Labs/Diagnostic Test Results:   CT renal 8 mm right ureteropelvic junction calculus resulting in mild hydronephrosis     Results from last 7 days   Lab Units 07/08/20 2229   SARS-COV-2  Negative     Results from last 7 days   Lab Units 07/08/20 2016   WBC Thousand/uL 9 74   HEMOGLOBIN g/dL 14 9   HEMATOCRIT % 46 1   PLATELETS Thousands/uL 148*   NEUTROS ABS Thousands/µL 7 88*         Results from last 7 days   Lab Units 07/08/20 2016   SODIUM mmol/L 138   POTASSIUM mmol/L 4 0   CHLORIDE mmol/L 102   CO2 mmol/L 28   ANION GAP mmol/L 8   BUN mg/dL 17   CREATININE mg/dL 1 16   EGFR ml/min/1 73sq m 70   CALCIUM mg/dL 9 2     Results from last 7 days   Lab Units 07/08/20  2016   AST U/L 22   ALT U/L 39   ALK PHOS U/L 77   TOTAL PROTEIN g/dL 7 7   ALBUMIN g/dL 3 9   TOTAL BILIRUBIN mg/dL 0 50         Results from last 7 days   Lab Units 07/08/20  2016   GLUCOSE RANDOM mg/dL 116     Results from last 7 days   Lab Units 07/08/20  2016   PROTIME seconds 12 6   INR  0 95   PTT seconds 29     Results from last 7 days   Lab Units 07/08/20  2016   LIPASE u/L 189     Results from last 7 days   Lab Units 07/08/20 2021   CLARITY UA  Clear   COLOR UA  Light Yellow   SPEC GRAV UA  1 025   PH UA  5 5   GLUCOSE UA mg/dl Negative   KETONES UA mg/dl Negative   BLOOD UA  Large*   PROTEIN UA mg/dl Negative   NITRITE UA  Negative   BILIRUBIN UA  Negative   UROBILINOGEN UA E U /dl 0 2   LEUKOCYTES UA  Negative   WBC UA /hpf None Seen   RBC UA /hpf 20-30*   BACTERIA UA /hpf Occasional   EPITHELIAL CELLS WET PREP /hpf Occasional   MUCUS THREADS  Moderate*     ED Treatment:   Medication Administration from 07/08/2020 1941 to 07/09/2020 0018       Date/Time Order Dose Route Action Action by Comments     07/08/2020 2124 sodium chloride 0 9 % bolus 1,000 mL 0 mL Intravenous Stopped Mirta Case RN      07/08/2020 2017 sodium chloride 0 9 % bolus 1,000 mL 1,000 mL Intravenous New Bag Mirta Case RN      07/08/2020 2017 [facility] 4 mg 4 mg Intravenous Given Mirta Case RN      07/08/2020 2017 ketorolac (TORADOL) injection 30 mg 30 mg Intravenous Given Mirta Case RN         Past Medical History:   Diagnosis Date    Arthritis     Hypertension     Kidney stones     Umbilical hernia      Present on Admission:   Right nephrolithiasis   Essential hypertension   Hematuria   Thrombocytopenic disorder ([facility] [address] )      Admitting Diagnosis: Kidney stone [N20 0]  Flank pain [R10 9]  Right nephrolithiasis [N20 0]  Age/Sex: 53 y o  male  Admission Orders:  gmf  npo  Scheduled Medications:    Medications:  losartan 100 mg Oral Daily   predniSONE 15 mg Oral Daily   pyridoxine 50 mg Oral Daily   tamsulosin 0 4 mg Oral Daily With Dinner     Continuous IV Infusions:    sodium chloride 100 mL/hr Intravenous Continuous     PRN Meds:    acetaminophen 650 mg Oral Q6H PRN   calcium carbonate 1,000 mg Oral Daily PRN   ketorolac 15 mg Intravenous Q6H PRN   ondansetron 4 mg Intravenous Q6H PRN       IP CONSULT TO UROLOGY    Network Utilization Review Department  [email or web address]  ATTENTION: Please call with any questions or concerns to [phone] and carefully listen to the prompts so that you are directed to the right person  All voicemails are confidential   Julia Correa all requests for admission clinical reviews, approved or denied determinations and any other requests to dedicated fax number below belonging to the campus where the patient is receiving treatment   List of dedicated fax numbers for the Facilities:  [address] DENIALS (Administrative/Medical Necessity) [phone]   [address] (Maternity/NICU/Pediatrics) [phone]   Prasanth Garcia [phone]   Ho Baxter [phone]   [address] [place] [address] [phone]   145 [facility]  [phone]   1205 [facility] [address] [place] [address] [phone]   Adam Terry [phone]   2208 [facility]  [address] [address] [address] [facility] [phone]

## 2020-07-09 NOTE — ED PROVIDER NOTES
History  Chief Complaint   Patient presents with    Flank Pain     intermittent R flank pain and hematuria for over a week  pain much worse     50-year-old male presents with intermittent right flank pain over last 1-2 weeks states last week he noticed some hematuria for few days currently has some increased pain in the right flank going into the right groin  Patient does have a history of kidney stones in the past   Also history of high blood pressure  No fevers no chills does have some nausea vomiting associated with this  No other complaints      History provided by:  Patient and spouse   used: No        Prior to Admission Medications   Prescriptions Last Dose Informant Patient Reported? Taking? Multiple Vitamin (VITAMIN E/FOLIC FLVE/A-6/S-36 PO)   Yes Yes   Sig: Take by mouth daily   predniSONE 10 mg tablet   Yes Yes   Sig: Take 15 mg by mouth daily   valsartan (DIOVAN) 320 MG tablet   Yes Yes   Sig: Take 320 mg by mouth daily      Facility-Administered Medications: None       Past Medical History:   Diagnosis Date    Hypertension     Kidney stones     Umbilical hernia        History reviewed  No pertinent surgical history  History reviewed  No pertinent family history  I have reviewed and agree with the history as documented  E-Cigarette/Vaping    E-Cigarette Use Never User      E-Cigarette/Vaping Substances     Social History     Tobacco Use    Smoking status: Never Smoker    Smokeless tobacco: Never Used   Substance Use Topics    Alcohol use: Yes     Comment: social 1-2 drinks a week    Drug use: Never       Review of Systems   Constitutional: Negative for activity change, chills, diaphoresis and fever  HENT: Negative for congestion, ear pain, nosebleeds, sore throat, trouble swallowing and voice change  Eyes: Negative for pain, discharge and redness  Respiratory: Negative for apnea, cough, choking, shortness of breath, wheezing and stridor      Cardiovascular: Negative for chest pain and palpitations  Gastrointestinal: Positive for nausea and vomiting  Negative for abdominal distention, abdominal pain, constipation and diarrhea  Endocrine: Negative for polydipsia  Genitourinary: Positive for flank pain and hematuria  Negative for difficulty urinating, dysuria, frequency and urgency  Musculoskeletal: Negative for back pain, gait problem, joint swelling, myalgias, neck pain and neck stiffness  Skin: Negative for pallor and rash  Neurological: Negative for dizziness, tremors, syncope, speech difficulty, weakness, numbness and headaches  Hematological: Negative for adenopathy  Psychiatric/Behavioral: Negative for confusion, hallucinations, self-injury and suicidal ideas  The patient is not nervous/anxious  Physical Exam  Physical Exam   Constitutional: He is oriented to person, place, and time  Vital signs are normal  He appears well-developed and well-nourished  No distress  HENT:   Head: Normocephalic and atraumatic  Right Ear: External ear normal    Left Ear: External ear normal    Nose: Nose normal    Mouth/Throat: Oropharynx is clear and moist    Eyes: Pupils are equal, round, and reactive to light  Conjunctivae are normal    Neck: Normal range of motion  Neck supple  Cardiovascular: Normal rate, regular rhythm, normal heart sounds and intact distal pulses  Pulmonary/Chest: Effort normal and breath sounds normal    Abdominal: Soft  Bowel sounds are normal    Patient has some slight right flank pain radiating to the right groin no testicular pain   Musculoskeletal: Normal range of motion  Neurological: He is alert and oriented to person, place, and time  He has normal reflexes  Skin: Skin is warm and dry  He is not diaphoretic  Psychiatric: He has a normal mood and affect  Nursing note and vitals reviewed        Vital Signs  ED Triage Vitals [07/08/20 1946]   Temperature Pulse Respirations Blood Pressure SpO2   (!) 96 5 °F (35 8 °C) 76 20 (!) 171/92 98 %      Temp Source Heart Rate Source Patient Position - Orthostatic VS BP Location FiO2 (%)   Tympanic Monitor Standing Right arm --      Pain Score       9           Vitals:    07/08/20 1946 07/08/20 2125 07/08/20 2130   BP: (!) 171/92 135/71    Pulse: 76  70   Patient Position - Orthostatic VS: Standing           Visual Acuity      ED Medications  Medications   sodium chloride 0 9 % bolus 1,000 mL (0 mL Intravenous Stopped 7/8/20 2124)   ondansetron (ZOFRAN) injection 4 mg (4 mg Intravenous Given 7/8/20 2017)   ketorolac (TORADOL) injection 30 mg (30 mg Intravenous Given 7/8/20 2017)       Diagnostic Studies  Results Reviewed     Procedure Component Value Units Date/Time    Urine Microscopic [[id number]]  (Abnormal) Collected:  07/08/20 2021    Lab Status:  Final result Specimen:  Urine, Clean Catch Updated:  07/08/20 2054     RBC, UA 20-30 /hpf      WBC, UA None Seen /hpf      Epithelial Cells Occasional /hpf      Bacteria, UA Occasional /hpf      MUCUS THREADS Moderate    Comprehensive metabolic panel [[id number]] Collected:  07/08/20 2016    Lab Status:  Final result Specimen:  Blood from Arm, Left Updated:  07/08/20 2039     Sodium 138 mmol/L      Potassium 4 0 mmol/L      Chloride 102 mmol/L      CO2 28 mmol/L      ANION GAP 8 mmol/L      BUN 17 mg/dL      Creatinine 1 16 mg/dL      Glucose 116 mg/dL      Calcium 9 2 mg/dL      AST 22 U/L      ALT 39 U/L      Alkaline Phosphatase 77 U/L      Total Protein 7 7 g/dL      Albumin 3 9 g/dL      Total Bilirubin 0 50 mg/dL      eGFR 70 ml/min/1 73sq m     Narrative:       Roman guidelines for Chronic Kidney Disease (CKD):     Stage 1 with normal or high GFR (GFR > 90 mL/min/1 73 square meters)    Stage 2 Mild CKD (GFR = 60-89 mL/min/1 73 square meters)    Stage 3A Moderate CKD (GFR = 45-59 mL/min/1 73 square meters)    Stage 3B Moderate CKD (GFR = 30-44 mL/min/1 73 square meters)    Stage 4 Severe CKD (GFR = 15-29 mL/min/1 73 square meters)    Stage 5 End Stage CKD (GFR <15 mL/min/1 73 square meters)  Note: GFR calculation is accurate only with a steady state creatinine    Lipase [[id number]]  (Normal) Collected:  07/08/20 2016    Lab Status:  Final result Specimen:  Blood from Arm, Left Updated:  07/08/20 2039     Lipase 189 u/L     Protime-INR [[id number]]  (Normal) Collected:  07/08/20 2016    Lab Status:  Final result Specimen:  Blood from Arm, Left Updated:  07/08/20 2036     Protime 12 6 seconds      INR 0 95    APTT [[id number]]  (Normal) Collected:  07/08/20 2016    Lab Status:  Final result Specimen:  Blood from Arm, Left Updated:  07/08/20 2036     PTT 29 seconds     UA (URINE) with reflex to Scope [[id number]]  (Abnormal) Collected:  07/08/20 2021    Lab Status:  Final result Specimen:  Urine, Clean Catch Updated:  07/08/20 2027     Color, UA Light Yellow     Clarity, UA Clear     Specific Gravity, UA 1 025     pH, UA 5 5     Leukocytes, UA Negative     Nitrite, UA Negative     Protein, UA Negative mg/dl      Glucose, UA Negative mg/dl      Ketones, UA Negative mg/dl      Urobilinogen, UA 0 2 E U /dl      Bilirubin, UA Negative     Blood, UA Large    CBC and differential [[id number]]  (Abnormal) Collected:  07/08/20 2016    Lab Status:  Final result Specimen:  Blood from Arm, Left Updated:  07/08/20 2022     WBC 9 74 Thousand/uL      RBC 5 49 Million/uL      Hemoglobin 14 9 g/dL      Hematocrit 46 1 %      MCV 84 fL      MCH 27 1 pg      MCHC 32 3 g/dL      RDW 14 1 %      MPV 12 0 fL      Platelets 928 Thousands/uL      nRBC 0 /100 WBCs      Neutrophils Relative 81 %      Immat GRANS % 0 %      Lymphocytes Relative 9 %      Monocytes Relative 8 %      Eosinophils Relative 1 %      Basophils Relative 1 %      Neutrophils Absolute 7 88 Thousands/µL      Immature Grans Absolute 0 04 Thousand/uL      Lymphocytes Absolute 0 90 Thousands/µL      Monocytes Absolute 0 73 Thousand/µL      Eosinophils Absolute 0 13 Thousand/µL Basophils Absolute 0 06 Thousands/µL                  CT renal stone study abdomen pelvis without contrast   Final Result by Miguel Story DO (07/08 2043)      8 mm right ureteropelvic junction calculus resulting in mild hydronephrosis  Workstation performed: [id number]                    Procedures  Procedures         ED Course       US AUDIT      Most Recent Value   Initial Alcohol Screen: US AUDIT-C    1  How often do you have a drink containing alcohol? 3 Filed at: 07/08/2020 1948   Audit-C Score  3 Filed at: 07/08/2020 1948                  VENESSA/DAST-10      Most Recent Value   How many times in the past year have you    Used an illegal drug or used a prescription medication for non-medical reasons? Never Filed at: 07/08/2020 1948                                MDM      Disposition  Final diagnoses:   Kidney stone     Time reflects when diagnosis was documented in both MDM as applicable and the Disposition within this note     Time User Action Codes Description Comment    7/8/2020 10:21 PM Refugia Drop Add [N20 0] Kidney stone       ED Disposition     ED Disposition Condition Date/Time Comment    Admit Stable Wed Jul 8, 2020 10:21 PM Case was discussed with Halle Santos and the patient's admission status was agreed to be Admission Status: observation status to the service of Dr Chris Shi     Follow-up Information    None         Patient's Medications   Discharge Prescriptions    No medications on file     No discharge procedures on file      PDMP Review     None          ED Provider  Electronically Signed by           Sarai Rausch DO  07/08/20 5938

## 2020-07-09 NOTE — ASSESSMENT & PLAN NOTE
Patient reports experiencing right flank/groin pain approximately 10 days ago  It is been on and off since then, but started with severe right flank pain today while he was at work  He began to feel nauseous, had 1 episode vomiting at home before coming into the ED  He does have a history of kidney stones in the past, and states he passed all of the them without intervention  He noticed hematuria once or twice since the pain started 10 days ago, most recent episode of hematuria on Saturday  A CT done in 9/2019 noted an 8 mm right renal calculus without hydronephrosis or hydroureter  · CT showed 8mm calculus within the R ureteropelvic junction with mild hydronephrosis, retrograde pyelogram identified a 10 mm stone and stent was placed   Patient will follow up in one week for stent removal with Dr Sánchez Ramirez  · Very minimal pain at discharge , still passing tea colored    Patient instructed to call the office if unable to urinate or increasing gross hematuria

## 2020-07-09 NOTE — CONSULTS
H&P Exam - Urology       Patient: Manisha Amador   : 1965 Sex: male   MRN: [id number]     CSN: [id number]      History of Present Illness   HPI:  Manisha Amador is a 53 y o  male who presents with severe right flank pain nausea vomiting starting to yesterday presenting to [address] [place] [address] ER last night found to have on spiral CT scan 8 mm right UPJ proximal stone admitted due to pain and nausea seen at the bedside at this time patient states he has had at the 6 stone attacks presenting wants to [facility] ER and passing it all the other stones he has passed at this time he continues to note pain and wishes to undergo cysto stent possible ureteroscopy laser lithotripsy in fear of uncontrolled pain and inability to pass stone patient aware that he does have a 25% chance of passing it with medical expulsion therapy but does not wish to move forward with that        Review of Systems:   Constitutional:  Negative for activity change, fever, chills and diaphoresis  HENT: Negative for hearing loss and trouble swallowing  Eyes: Negative for itching and visual disturbance  Respiratory: Negative for chest tightness and shortness of breath  Cardiovascular: Negative for chest pain, edema  Gastrointestinal: Negative for abdominal distention, na abdominal pain, constipation, diarrhea, Nausea and vomiting  Genitourinary: Negative for decreased urine volume, difficulty urinating, dysuria, enuresis, frequency, hematuria and urgency  Musculoskeletal: Negative for gait problem and myalgias  Neurological: Negative for dizziness and headaches  Hematological: Does not bruise/bleed easily  Historical Information   Past Medical History:   Diagnosis Date    Arthritis     Hypertension     Kidney stones     Umbilical hernia      History reviewed  No pertinent surgical history    Social History   Social History     Substance and Sexual Activity   Alcohol Use Yes    Frequency: 2-4 times a month    Drinks per session: 1 or 2    Binge frequency: Never    Comment: social 1-2 drinks a week at most, not consistently     Social History     Substance and Sexual Activity   Drug Use Never     Social History     Tobacco Use   Smoking Status Never Smoker   Smokeless Tobacco Never Used     Family History:   Family History   Problem Relation Age of Onset    No Known Problems Mother     Splenomegaly Father     Thrombocytopenia Father     Thrombocytopenia Brother        Meds/Allergies   Medications Prior to Admission   Medication    predniSONE 10 mg tablet    pyridoxine (VITAMIN B6) 50 mg tablet    valsartan (DIOVAN) 320 MG tablet    Multiple Vitamin (VITAMIN E/FOLIC HEFP/S-4/B-36 PO)     No Known Allergies    Objective   Vitals: /74 (BP Location: Right arm)   Pulse 72   Temp 98 °F (36 7 °C) (Oral)   Resp 16   Ht 6' 1" (1 854 m)   Wt 101 kg (222 lb 0 1 oz)   SpO2 96%   BMI 29 29 kg/m²     Physical Exam:  General Alert awake   Normocephalic atraumatic PERRLA  Lungs clear bilaterally  Cardiac normal S1 normal S2  Abdomen soft, flank pain  Extremities no edema    I/O last 24 hours:   In: 1000 [IV Piggyback:1000]  Out: -     Invasive Devices     Peripheral Intravenous Line            Peripheral IV 07/08/20 Left Antecubital less than 1 day                    Lab Results: CBC:   Lab Results   Component Value Date    WBC 9 74 07/08/2020    HGB 14 9 07/08/2020    HCT 46 1 07/08/2020    MCV 84 07/08/2020     (L) 07/08/2020    MCH 27 1 07/08/2020    MCHC 32 3 07/08/2020    RDW 14 1 07/08/2020    MPV 12 0 07/08/2020    NRBC 0 07/08/2020     CMP:   Lab Results   Component Value Date     07/08/2020    CO2 28 07/08/2020    BUN 17 07/08/2020    CREATININE 1 16 07/08/2020    CALCIUM 9 2 07/08/2020    AST 22 07/08/2020    ALT 39 07/08/2020    ALKPHOS 77 07/08/2020    EGFR 70 07/08/2020     Urinalysis:   Lab Results   Component Value Date    COLORU Light Yellow 07/08/2020    CLARITYU Clear 07/08/2020 SPECGRAV 1 025 07/08/2020    PHUR 5 5 07/08/2020    LEUKOCYTESUR Negative 07/08/2020    NITRITE Negative 07/08/2020    GLUCOSEU Negative 07/08/2020    KETONESU Negative 07/08/2020    BILIRUBINUR Negative 07/08/2020    BLOODU Large (A) 07/08/2020     Urine Culture: No results found for: URINECX  PSA: No results found for: PSA        Assessment/ Plan:  NPO  Cysto right stent right ureteroscopy laser basket extraction      Hortencia Ross MD

## 2020-07-09 NOTE — ASSESSMENT & PLAN NOTE
Patient may resume prednisone and f/u with rheumatology and PCP as out patient    Would have low threshold to give Abx due to being on steroids and noted splenomegaly ( could be at risk for functional aspleenia, issue making him susceptible to encapsulated infections)

## 2020-07-09 NOTE — OP NOTE
OPERATIVE REPORT  PATIENT NAME: Sarah Littlejohn    :  1965  MRN: [id number]  Pt Location: [facility] OR ROOM 04    SURGERY DATE: 2020    Surgeon(s) and Role:     * Flavio Meyers MD - Primary    Preop Diagnosis:  Right 10 mm renal stone    Post-Op Diagnosis Codes:  Right 10 mm renal stone        Specimen(s):  * No specimens in log *    Estimated Blood Loss:   Minimal    Drains:  * No LDAs found *    Anesthesia Type:   IV Sedation with Anesthesia    Operative Indications:  Kidney stone [N20 0]  This 41-year-old male met at the bedside last night presented to the ER at [address] [place] [address] with worsening right flank pain patient stated that he has had at least a half a dozen attacks of ureteral colic in his life time with 1st 1 presenting him to [facility] ER worry past he has never seen a urologist for evaluation he developed severe pain earlier today presenting to the ER tonight found on spiral CT scan to have an 8-10 mm right renal pelvic UPJ stone patient was watched overnight and now at this time continues to have severe pain to undergo cysto right stent    Operative Findings:  10 mm stone easily seen in the renal pelvis 24 cm 6 Yi stent past    Complications:   None    Procedure and Technique: At the patient identified in the holding area consent signed right side marked he was placed in the op suite  After anesthesia induced he was placed on thigh position draped prep standard fashion  Time-out performed  Twenty-two Yi cystoscope passed through through the bladder  Anterior urethra showed no abnormalities  Posterior the confirmed a 2 0 cm  prostatic urethra    Fluoroscopic views of the kidney confirmed at least 10 mm ovoid stone a 0 038 wire was passed through the right orifice up into the renal pelvis stone pushed back from the pelvis up into the upper pole over the wire 24 cm 6 Yi stent was passed the wire removed scope removed postop procedure well taken to recovery room stable condition     I was present for the entire procedure    Patient Disposition:  PACU     SIGNATURE: John Bess MD  DATE: July 9, 2020  TIME: 1:20 PM

## 2020-07-09 NOTE — ANESTHESIA PREPROCEDURE EVALUATION
Review of Systems/Medical History  Patient summary reviewed  Chart reviewed  No history of anesthetic complications     Cardiovascular  Exercise tolerance (METS): >4,  Hypertension ,    Pulmonary       GI/Hepatic    Liver disease ,        Kidney stones,        Endo/Other     GYN       Hematology    Thrombocytopenia,    Musculoskeletal    Arthritis     Neurology   Psychology           Physical Exam    Airway    Mallampati score: II  TM Distance: >3 FB  Neck ROM: full     Dental   No notable dental hx     Cardiovascular  Cardiovascular exam normal    Pulmonary  Pulmonary exam normal     Other Findings        Anesthesia Plan  ASA Score- 2     Anesthesia Type-   Additional Monitors:   Airway Plan: LMA  Plan Factors-    Induction- intravenous  Postoperative Plan- Plan for postoperative opioid use  Informed Consent- Anesthetic plan and risks discussed with patient  I personally reviewed this patient with the CRNA  Discussed and agreed on the Anesthesia Plan with the CRNA  Abbie Polk

## 2020-07-09 NOTE — ASSESSMENT & PLAN NOTE
Patient reports experiencing right flank/groin pain approximately 10 days ago  It is been on and off since then, but started with severe right flank pain today while he was at work  He began to feel nauseous, had 1 episode vomiting at home before coming into the ED  He does have a history of kidney stones in the past, and states he passed all of the them without intervention  He noticed hematuria once or twice since the pain started 10 days ago, most recent episode of hematuria on Saturday  A CT done in 9/2019 noted an 8 mm right renal calculus without hydronephrosis or hydroureter    · CT showed 8mm calculus within the R ureteropelvic junction with mild hydronephrosis  · Hx of kidney stones   · IVF  · NPO after midnight  · Flomax  · Strain all urine  · PRN pain management and antiemetics  · Urology consultation

## 2020-07-09 NOTE — PLAN OF CARE
Problem: PAIN - ADULT  Goal: Verbalizes/displays adequate comfort level or baseline comfort level  Description  Interventions:  - Encourage patient to monitor pain and request assistance  - Assess pain using appropriate pain scale  - Administer analgesics based on type and severity of pain and evaluate response  - Implement non-pharmacological measures as appropriate and evaluate response  - Consider cultural and social influences on pain and pain management  - Notify physician/advanced practitioner if interventions unsuccessful or patient reports new pain  Outcome: Progressing     Problem: SAFETY ADULT  Goal: Patient will remain free of falls  Description  INTERVENTIONS:  - Assess patient frequently for physical needs  -  Identify cognitive and physical deficits and behaviors that affect risk of falls    -  [place] fall precautions as indicated by assessment   - Educate patient/family on patient safety including physical limitations  - Instruct patient to call for assistance with activity based on assessment  - Modify environment to reduce risk of injury   Outcome: Progressing

## 2020-07-09 NOTE — ANESTHESIA POSTPROCEDURE EVALUATION
Post-Op Assessment Note    CV Status:  Stable  Pain Score: 0    Pain management: adequate     Mental Status:  Alert and awake   Hydration Status:  Euvolemic   PONV Controlled:  Controlled   Airway Patency:  Patent   Post Op Vitals Reviewed: Yes      Staff: CRNA           /60 (07/09/20 1330)    Temp (!) 97 3 °F (36 3 °C) (07/09/20 1330)    Pulse 78 (07/09/20 1330)   Resp 16 (07/09/20 1330)    SpO2

## 2020-07-09 NOTE — SOCIAL WORK
Per chart review and rounds with nursing and Dr Ming Angel pt is reported to be independent with no apparent discharge needs at this time

## 2020-07-09 NOTE — NURSING NOTE
Reviewed AVS with patient, removed IV  Gave written prescription to patient's spouse  Patient is ambulatory and left the floor with their belongings walking with the RN to go home with spouse

## 2020-07-09 NOTE — PLAN OF CARE
Problem: PAIN - ADULT  Goal: Verbalizes/displays adequate comfort level or baseline comfort level  Description  Interventions:  - Encourage patient to monitor pain and request assistance  - Assess pain using appropriate pain scale  - Administer analgesics based on type and severity of pain and evaluate response  - Implement non-pharmacological measures as appropriate and evaluate response  - Consider cultural and social influences on pain and pain management  - Notify physician/advanced practitioner if interventions unsuccessful or patient reports new pain  Outcome: Progressing     Problem: SAFETY ADULT  Goal: Patient will remain free of falls  Description  INTERVENTIONS:  - Assess patient frequently for physical needs  -  Identify cognitive and physical deficits and behaviors that affect risk of falls    -  [place] fall precautions as indicated by assessment   - Educate patient/family on patient safety including physical limitations  - Instruct patient to call for assistance with activity based on assessment  - Modify environment to reduce risk of injury   Outcome: Progressing

## 2020-07-11 RX ADMIN — IOHEXOL 10 ML: 240 INJECTION, SOLUTION INTRATHECAL; INTRAVASCULAR; INTRAVENOUS; ORAL at 09:59

## 2020-07-18 ENCOUNTER — TRANSCRIBE ORDERS (OUTPATIENT)
Dept: URGENT CARE | Facility: CLINIC | Age: 55
End: 2020-07-18

## 2020-07-18 DIAGNOSIS — Z11.59 SCREENING FOR VIRAL DISEASE: Primary | ICD-10-CM

## 2020-07-18 PROCEDURE — U0003 INFECTIOUS AGENT DETECTION BY NUCLEIC ACID (DNA OR RNA); SEVERE ACUTE RESPIRATORY SYNDROME CORONAVIRUS 2 (SARS-COV-2) (CORONAVIRUS DISEASE [COVID-19]), AMPLIFIED PROBE TECHNIQUE, MAKING USE OF HIGH THROUGHPUT TECHNOLOGIES AS DESCRIBED BY CMS-2020-01-R: HCPCS

## 2020-07-20 LAB
INPATIENT: NORMAL
SARS-COV-2 RNA SPEC QL NAA+PROBE: NOT DETECTED

## 2020-07-21 NOTE — PRE-PROCEDURE INSTRUCTIONS
My Surgical Experience    The following information was developed to assist you to prepare for your operation  What do I need to do before coming to the hospital?   Arrange for a responsible person to drive you to and from the hospital    Arrange care for your children at home  Children are not allowed in the recovery areas of the hospital   Plan to wear clothing that is easy to put on and take off  If you are having shoulder surgery, wear a shirt that buttons or zippers in the front  Bathing  o Shower the evening before and the morning of your surgery with an antibacterial soap  Please refer to the Pre Op Showering Instructions for Surgery Patients Sheet   o Remove nail polish and all body piercing jewelry  o Do not shave any body part for at least 24 hours before surgery-this includes face, arms, legs and upper body  Food  o Nothing to eat or drink after midnight the night before your surgery  This includes candy and chewing gum  o Exception: If your surgery is after 12:00pm (noon), you may have clear liquids such as 7-Up®, ginger ale, apple or cranberry juice, Jell-O®, water, or clear broth until 8:00 am  o Do not drink milk or juice with pulp on the morning before surgery  o Do not drink alcohol 24 hours before surgery  Medicine  o Follow instructions you received from your surgeon about which medicines you may take on the day of surgery  o If instructed to take medicine on the morning of surgery, take pills with just a small sip of water  Call your prescribing doctor for specific infroamtion on what to do if you take insulin    What should I bring to the hospital?    Bring:  Blake Paez or a walker, if you have them, for foot or knee surgery   A list of the daily medicines, vitamins, minerals, herbals and nutritional supplements you take   Include the dosages of medicines and the time you take them each day   Glasses, dentures or hearing aids   Minimal clothing; you will be wearing hospital sleepwear   Photo ID; required to verify your identity   If you have a Living Will or Power of , bring a copy of the documents   If you have an ostomy, bring an extra pouch and any supplies you use    Do not bring   Medicines or inhalers   Money, valuables or jewelry    What other information should I know about the day of surgery?  Notify your surgeons if you develop a cold, sore throat, cough, fever, rash or any other illness   Report to the Ambulatory Surgical/Same Day Surgery Unit   You will be instructed to stop at Registration only if you have not been pre-registered   Inform your  fi they do not stay that they will be asked by the staff to leave a phone number where they can be reached   Be available to be reached before surgery  In the event the operating room schedule changes, you may be asked to come in earlier or later than expected    *It is important to tell your doctor and others involved in your health care if you are taking or have been taking any non-prescription drugs, vitamins, minerals, herbals or other nutritional supplements  Any of these may interact with some food or medicines and cause a reaction      Pre-Surgery Instructions:   Medication Instructions    ketorolac (TORADOL) 10 mg tablet Instructed patient per Anesthesia Guidelines   Multiple Vitamin (VITAMIN E/FOLIC IO/I-9/R-82 PO) Instructed patient per Anesthesia Guidelines   pyridoxine (VITAMIN B6) 50 mg tablet Instructed patient per Anesthesia Guidelines   valsartan (DIOVAN) 320 MG tablet Instructed patient per Anesthesia Guidelines

## 2020-07-22 ENCOUNTER — ANESTHESIA EVENT (OUTPATIENT)
Dept: PERIOP | Facility: HOSPITAL | Age: 55
End: 2020-07-22
Payer: COMMERCIAL

## 2020-07-22 NOTE — ANESTHESIA PREPROCEDURE EVALUATION
Review of Systems/Medical History  Patient summary reviewed  Chart reviewed  No history of anesthetic complications     Cardiovascular  Hypertension ,    Pulmonary       GI/Hepatic    Liver disease ,        Kidney stones,        Endo/Other     GYN       Hematology    Thrombocytopenia,    Musculoskeletal    Arthritis     Neurology   Psychology                Anesthesia Plan  ASA Score- 2     Anesthesia Type- general with ASA Monitors  Additional Monitors:   Airway Plan: LMA  Plan Factors-    Induction- intravenous  Postoperative Plan- Plan for postoperative opioid use  Informed Consent- Anesthetic plan and risks discussed with patient  I personally reviewed this patient with the CRNA  Discussed and agreed on the Anesthesia Plan with the AILYN Banks

## 2020-07-22 NOTE — H&P
H&P Exam - Urology       Patient: Cynthia Warren   : 1965 Sex: male   MRN: [id number]     CSN: [id number]      History of Present Illness   HPI:  Cynthia Warren is a 53 y o  male who presents with 810 mm right renal stone right stent for right ureteroscopy        Review of Systems:   Constitutional:  Negative for activity change, fever, chills and diaphoresis  HENT: Negative for hearing loss and trouble swallowing  Eyes: Negative for itching and visual disturbance  Respiratory: Negative for chest tightness and shortness of breath  Cardiovascular: Negative for chest pain, edema  Gastrointestinal: Negative for abdominal distention, na abdominal pain, constipation, diarrhea, Nausea and vomiting  Genitourinary: Negative for decreased urine volume, difficulty urinating, dysuria, enuresis, frequency, hematuria and urgency  Musculoskeletal: Negative for gait problem and myalgias  Neurological: Negative for dizziness and headaches  Hematological: Does not bruise/bleed easily         Historical Information   Past Medical History:   Diagnosis Date    Arthritis     Hypertension     Kidney stone     Kidney stones     Umbilical hernia      Past Surgical History:   Procedure Laterality Date    COLONOSCOPY      CYSTOSCOPY      FL RETROGRADE PYELOGRAM  2020    [place] CYSTO/URETERO W/LITHOTRIPSY &INDWELL STENT INSRT Right 2020    Procedure: CYSTOSCOPY WITH RIGHT STENT PLACEMENT;  Surgeon: Mike Osborne MD;  Location: [facility];  Service: Urology     Social History   Social History     Substance and Sexual Activity   Alcohol Use Yes    Frequency: 2-4 times a month    Drinks per session: 1 or 2    Binge frequency: Never    Comment: social 1-2 drinks a week at most, not consistently     Social History     Substance and Sexual Activity   Drug Use Never     Social History     Tobacco Use   Smoking Status Never Smoker   Smokeless Tobacco Never Used     Family History:   Family History Problem Relation Age of Onset    No Known Problems Mother     Splenomegaly Father     Thrombocytopenia Father     Thrombocytopenia Brother        Meds/Allergies   No medications prior to admission  No Known Allergies    Objective   Vitals: There were no vitals taken for this visit  Physical Exam:  General Alert awake   Normocephalic atraumatic PERRLA  Lungs clear bilaterally  Cardiac normal S1 normal S2  Abdomen soft, flank pain right  Extremities no edema    No intake/output data recorded      Invasive Devices     None                     Lab Results: CBC:   Lab Results   Component Value Date    WBC 9 74 07/08/2020    HGB 14 9 07/08/2020    HCT 46 1 07/08/2020    MCV 84 07/08/2020     (L) 07/08/2020    MCH 27 1 07/08/2020    MCHC 32 3 07/08/2020    RDW 14 1 07/08/2020    MPV 12 0 07/08/2020    NRBC 0 07/08/2020     CMP:   Lab Results   Component Value Date     07/08/2020    CO2 28 07/08/2020    BUN 17 07/08/2020    CREATININE 1 16 07/08/2020    CALCIUM 9 2 07/08/2020    AST 22 07/08/2020    ALT 39 07/08/2020    ALKPHOS 77 07/08/2020    EGFR 70 07/08/2020     Urinalysis:   Lab Results   Component Value Date    COLORU Light Yellow 07/08/2020    CLARITYU Clear 07/08/2020    SPECGRAV 1 025 07/08/2020    PHUR 5 5 07/08/2020    LEUKOCYTESUR Negative 07/08/2020    NITRITE Negative 07/08/2020    GLUCOSEU Negative 07/08/2020    KETONESU Negative 07/08/2020    BILIRUBINUR Negative 07/08/2020    BLOODU Large (A) 07/08/2020     Urine Culture: No results found for: URINECX  PSA: No results found for: PSA        Assessment/ Plan:  Cystoscopy right ureteroscopy laser basket stent exchange      Letty Cason MD

## 2020-07-23 ENCOUNTER — HOSPITAL ENCOUNTER (OUTPATIENT)
Facility: HOSPITAL | Age: 55
Setting detail: OUTPATIENT SURGERY
Discharge: HOME | End: 2020-07-23
Attending: SPECIALIST | Admitting: SPECIALIST
Payer: COMMERCIAL

## 2020-07-23 ENCOUNTER — ANESTHESIA (OUTPATIENT)
Dept: PERIOP | Facility: HOSPITAL | Age: 55
End: 2020-07-23
Payer: COMMERCIAL

## 2020-07-23 ENCOUNTER — APPOINTMENT (OUTPATIENT)
Dept: RADIOLOGY | Facility: HOSPITAL | Age: 55
End: 2020-07-23
Payer: COMMERCIAL

## 2020-07-23 VITALS
RESPIRATION RATE: 18 BRPM | HEIGHT: 73 IN | OXYGEN SATURATION: 99 % | TEMPERATURE: 97.2 F | BODY MASS INDEX: 29.82 KG/M2 | DIASTOLIC BLOOD PRESSURE: 79 MMHG | HEART RATE: 71 BPM | WEIGHT: 225 LBS | SYSTOLIC BLOOD PRESSURE: 128 MMHG

## 2020-07-23 DIAGNOSIS — N20.0 CALCULUS OF KIDNEY: ICD-10-CM

## 2020-07-23 PROCEDURE — C1894 INTRO/SHEATH, NON-LASER: HCPCS | Performed by: SPECIALIST

## 2020-07-23 PROCEDURE — 82360 CALCULUS ASSAY QUANT: CPT | Performed by: SPECIALIST

## 2020-07-23 PROCEDURE — C2617 STENT, NON-COR, TEM W/O DEL: HCPCS | Performed by: SPECIALIST

## 2020-07-23 PROCEDURE — C1769 GUIDE WIRE: HCPCS | Performed by: SPECIALIST

## 2020-07-23 PROCEDURE — 88300 SURGICAL PATH GROSS: CPT | Performed by: PATHOLOGY

## 2020-07-23 PROCEDURE — 74420 UROGRAPHY RTRGR +-KUB: CPT

## 2020-07-23 DEVICE — URETERAL STENT 6 FR X 24CM INLAY: Type: IMPLANTABLE DEVICE | Site: URETER | Status: FUNCTIONAL

## 2020-07-23 RX ORDER — MAGNESIUM HYDROXIDE 1200 MG/15ML
LIQUID ORAL AS NEEDED
Status: DISCONTINUED | OUTPATIENT
Start: 2020-07-23 | End: 2020-07-23 | Stop reason: HOSPADM

## 2020-07-23 RX ORDER — DEXAMETHASONE SODIUM PHOSPHATE 4 MG/ML
INJECTION, SOLUTION INTRA-ARTICULAR; INTRALESIONAL; INTRAMUSCULAR; INTRAVENOUS; SOFT TISSUE AS NEEDED
Status: DISCONTINUED | OUTPATIENT
Start: 2020-07-23 | End: 2020-07-23 | Stop reason: SURG

## 2020-07-23 RX ORDER — FENTANYL CITRATE 50 UG/ML
INJECTION, SOLUTION INTRAMUSCULAR; INTRAVENOUS AS NEEDED
Status: DISCONTINUED | OUTPATIENT
Start: 2020-07-23 | End: 2020-07-23 | Stop reason: SURG

## 2020-07-23 RX ORDER — MIDAZOLAM HYDROCHLORIDE 2 MG/2ML
INJECTION, SOLUTION INTRAMUSCULAR; INTRAVENOUS AS NEEDED
Status: DISCONTINUED | OUTPATIENT
Start: 2020-07-23 | End: 2020-07-23 | Stop reason: SURG

## 2020-07-23 RX ORDER — SODIUM CHLORIDE, SODIUM LACTATE, POTASSIUM CHLORIDE, CALCIUM CHLORIDE 600; 310; 30; 20 MG/100ML; MG/100ML; MG/100ML; MG/100ML
INJECTION, SOLUTION INTRAVENOUS CONTINUOUS PRN
Status: DISCONTINUED | OUTPATIENT
Start: 2020-07-23 | End: 2020-07-23 | Stop reason: SURG

## 2020-07-23 RX ORDER — VALSARTAN 320 MG/1
320 TABLET ORAL EVERY MORNING
COMMUNITY
End: 2021-07-06

## 2020-07-23 RX ORDER — PROPOFOL 10 MG/ML
INJECTION, EMULSION INTRAVENOUS AS NEEDED
Status: DISCONTINUED | OUTPATIENT
Start: 2020-07-23 | End: 2020-07-23 | Stop reason: SURG

## 2020-07-23 RX ORDER — FENTANYL CITRATE/PF 50 MCG/ML
25 SYRINGE (ML) INJECTION
Status: DISCONTINUED | OUTPATIENT
Start: 2020-07-23 | End: 2020-07-23 | Stop reason: HOSPADM

## 2020-07-23 RX ORDER — KETOROLAC TROMETHAMINE 30 MG/ML
INJECTION, SOLUTION INTRAMUSCULAR; INTRAVENOUS AS NEEDED
Status: DISCONTINUED | OUTPATIENT
Start: 2020-07-23 | End: 2020-07-23 | Stop reason: SURG

## 2020-07-23 RX ORDER — ONDANSETRON 2 MG/ML
INJECTION INTRAMUSCULAR; INTRAVENOUS AS NEEDED
Status: DISCONTINUED | OUTPATIENT
Start: 2020-07-23 | End: 2020-07-23 | Stop reason: SURG

## 2020-07-23 RX ORDER — LIDOCAINE HYDROCHLORIDE 10 MG/ML
INJECTION, SOLUTION EPIDURAL; INFILTRATION; INTRACAUDAL; PERINEURAL AS NEEDED
Status: DISCONTINUED | OUTPATIENT
Start: 2020-07-23 | End: 2020-07-23 | Stop reason: SURG

## 2020-07-23 RX ORDER — LEVOFLOXACIN 5 MG/ML
500 INJECTION, SOLUTION INTRAVENOUS ONCE
Status: COMPLETED | OUTPATIENT
Start: 2020-07-23 | End: 2020-07-23

## 2020-07-23 RX ADMIN — MIDAZOLAM HYDROCHLORIDE 2 MG: 1 INJECTION, SOLUTION INTRAMUSCULAR; INTRAVENOUS at 10:04

## 2020-07-23 RX ADMIN — FENTANYL CITRATE 50 MCG: 50 INJECTION, SOLUTION INTRAMUSCULAR; INTRAVENOUS at 10:17

## 2020-07-23 RX ADMIN — FENTANYL CITRATE 25 MCG: 50 INJECTION, SOLUTION INTRAMUSCULAR; INTRAVENOUS at 10:10

## 2020-07-23 RX ADMIN — LIDOCAINE HYDROCHLORIDE 50 MG: 10 INJECTION, SOLUTION EPIDURAL; INFILTRATION; INTRACAUDAL; PERINEURAL at 10:08

## 2020-07-23 RX ADMIN — SODIUM CHLORIDE, SODIUM LACTATE, POTASSIUM CHLORIDE, AND CALCIUM CHLORIDE: .6; .31; .03; .02 INJECTION, SOLUTION INTRAVENOUS at 09:55

## 2020-07-23 RX ADMIN — KETOROLAC TROMETHAMINE 30 MG: 30 INJECTION, SOLUTION INTRAMUSCULAR at 11:08

## 2020-07-23 RX ADMIN — DEXAMETHASONE SODIUM PHOSPHATE 4 MG: 4 INJECTION, SOLUTION INTRA-ARTICULAR; INTRALESIONAL; INTRAMUSCULAR; INTRAVENOUS; SOFT TISSUE at 10:15

## 2020-07-23 RX ADMIN — LEVOFLOXACIN 500 MG: 5 INJECTION, SOLUTION INTRAVENOUS at 09:17

## 2020-07-23 RX ADMIN — ONDANSETRON 4 MG: 2 INJECTION INTRAMUSCULAR; INTRAVENOUS at 10:15

## 2020-07-23 RX ADMIN — FENTANYL CITRATE 25 MCG: 50 INJECTION, SOLUTION INTRAMUSCULAR; INTRAVENOUS at 10:12

## 2020-07-23 RX ADMIN — PROPOFOL 200 MG: 10 INJECTION, EMULSION INTRAVENOUS at 10:08

## 2020-07-23 NOTE — PROGRESS NOTES
Progress Note - Urology      Patient: Chuck Key   : 1965 Sex: male   MRN: [id number]     CSN: [id number]  Unit/Bed#: OR POOL     SUBJECTIVE:   Patient seen preop holding area  8 mm stone  Stent  To undergo ureteroscopy laser stent exchange aware risk of anesthesia infection bleeding additional urologic procedures      Objective   Vitals: /71   Pulse 73   Temp 98 4 °F (36 9 °C) (Tympanic)   Resp 18   Ht 6' 1" (1 854 m)   Wt 102 kg (225 lb)   SpO2 90%   BMI 29 69 kg/m²     No intake/output data recorded        Physical Exam:   General Alert awake   Normocephalic atraumatic PERRLA  Lungs clear bilaterally  Cardiac normal S1 normal S2  Abdomen soft, flank pain  Extremities no edema      Lab Results: CBC:   Lab Results   Component Value Date    WBC 9 74 2020    HGB 14 9 2020    HCT 46 1 2020    MCV 84 2020     (L) 2020    MCH 27 1 2020    MCHC 32 3 2020    RDW 14 1 2020    MPV 12 0 2020    NRBC 0 2020     CMP:   Lab Results   Component Value Date     2020    CO2 28 2020    BUN 17 2020    CREATININE 1 16 2020    CALCIUM 9 2 2020    AST 22 2020    ALT 39 2020    ALKPHOS 77 2020    EGFR 70 2020     Urinalysis:   Lab Results   Component Value Date    COLORU Light Yellow 2020    CLARITYU Clear 2020    SPECGRAV 1 025 2020    PHUR 5 5 2020    LEUKOCYTESUR Negative 2020    NITRITE Negative 2020    GLUCOSEU Negative 2020    KETONESU Negative 2020    BILIRUBINUR Negative 2020    BLOODU Large (A) 2020     Urine Culture: No results found for: URINECX  PSA: No results found for: PSA      Assessment/ Plan:  Cysto right ureteroscopy laser basket stent          John Bess MD

## 2020-07-23 NOTE — DISCHARGE INSTRUCTIONS
#1 no heavy straining or lifting above 10 pounds for 2 weeks    #2 call office fevers, chills, or worsening blood in the urine  #3 Patient scheduled for Prashanth Salbador office Friday July 31st 3:00 p m  For cysto right stent removal stone analysis pending      [address] [facility] office  [address] [place] [address]  [phone]  8:30 AM to 4:30 PM  Monday through Friday    [place] office  [phone]  [address]  [address]  [phone]  1:00 to 5:00 PM  Wednesday

## 2020-07-23 NOTE — OP NOTE
OPERATIVE REPORT  PATIENT NAME: Terri Cook    :  1965  MRN: [id number]  Pt Location: [facility] OR ROOM 04    SURGERY DATE: 2020    Surgeon(s) and Role:     * Elvin Fink MD - Primary    Preop Diagnosis:  10 mm right    Post-Op Diagnosis Codes:  10 mm right    Procedure(s) (LRB):  CYSTOSCOPY, URETEROSCOPY, LASER, AND STENT (Right)    Specimen(s):  ID Type Source Tests Collected by Time Destination   1 :  Calculus Kidney, Right STONE ANALYSIS, TISSUE EXAM Elvin Fink MD 2020 1044        Estimated Blood Loss:   Minimal    Drains:  Ureteral Drain/Stent Right ureter  (Active)   Number of days: 0       Anesthesia Type:   IV Sedation with Anesthesia    Operative Indications:  Calculus of kidney [N20 0]  This 76-year-old male was met on consultation at [facility] 2 weeks ago with right flank pain found to have 9-10 mm right UPJ proximal stone taken to the OR undergoing cysto right stent placement when seen in the office patient wants stent out as soon as possible now to undergo ureteroscopy laser stent exchange to be seen in the office next week to have stent removed    Operative Findings:  10 mm stone middle pole calyx holmium laser multiple small 1-2 mm fragments larger fragments grabbed with a basket stent exchanged patient to be seen in the office next week to have stent removed    Complications:   None    Procedure and Technique:  After patient identified in the holding area consent signed right side marked he was placed in the op suite  After anesthesia was induced he was placed thigh position draped prep standard fashion  Time-out performed  Twenty-two Georgian cystoscope passed through through the bladder  0 03 passed up into the right renal pelvis under fluoroscopic control the stent removed    2 038nd wire passed up into the right renal pelvis 1st left a safety 2nd cannulated with the longest ureteral she which was easily passed up into the renal pelvis contrast confirms sheath to be in pelvis obturator removed for flexible scope placed laser lithotripsy of the stone into multiple 1-2 mm fragments multiple fragments removed with the basket scope removed sheath removed over the safety wire 24 cm 6 Albanian stent past wire removed scope removed postop procedure awakened taken recovery stable condition   I was present for the entire procedure    Patient Disposition:  PACU     SIGNATURE: Colleen Karimi MD  DATE: July 23, 2020  TIME: 11:26 AM

## 2020-08-11 LAB
CALCIUM OXALATE DIHYDRATE MFR STONE IR: 10 %
COLOR STONE: NORMAL
COM MFR STONE: 90 %
COMMENT-STONE3: NORMAL
COMPOSITION: NORMAL
LABORATORY COMMENT REPORT: NORMAL
PHOTO: NORMAL
SIZE STONE: NORMAL MM
SPEC SOURCE SUBJ: NORMAL
STONE ANALYSIS-IMP: NORMAL
WT STONE: 38 MG

## 2020-09-05 ENCOUNTER — OFFICE VISIT (OUTPATIENT)
Dept: URGENT CARE | Facility: CLINIC | Age: 55
End: 2020-09-05
Payer: COMMERCIAL

## 2020-09-05 VITALS — HEART RATE: 70 BPM | OXYGEN SATURATION: 95 % | TEMPERATURE: 95.4 F

## 2020-09-05 DIAGNOSIS — Z11.59 SCREENING FOR VIRAL DISEASE: Primary | ICD-10-CM

## 2020-09-05 DIAGNOSIS — Z11.59 SCREENING FOR VIRAL DISEASE: ICD-10-CM

## 2020-09-05 PROCEDURE — U0003 INFECTIOUS AGENT DETECTION BY NUCLEIC ACID (DNA OR RNA); SEVERE ACUTE RESPIRATORY SYNDROME CORONAVIRUS 2 (SARS-COV-2) (CORONAVIRUS DISEASE [COVID-19]), AMPLIFIED PROBE TECHNIQUE, MAKING USE OF HIGH THROUGHPUT TECHNOLOGIES AS DESCRIBED BY CMS-2020-01-R: HCPCS | Performed by: PHYSICIAN ASSISTANT

## 2020-09-05 PROCEDURE — 99212 OFFICE O/P EST SF 10 MIN: CPT | Performed by: PHYSICIAN ASSISTANT

## 2020-09-05 RX ORDER — PREDNISONE 1 MG/1
5 TABLET ORAL DAILY
COMMUNITY
Start: 2020-07-06 | End: 2021-03-10

## 2020-09-05 NOTE — PATIENT INSTRUCTIONS
Please remain at home in quarantine until you receive the results of your COVID-19 testing and further instruction given  While at home you should isolate from others  Call for your test results, which should be back in 2-5 days  Please follow quarantine restrictions following travel as directed by the [facility]  Follow up with your family doctor in 3-5 days  Proceed to the ER if symptoms worsen  If you need to go to the ER please notify them of your arrival and wait in your car until further instruction is given  If you need to call 911 please notify the  of current suspicion for COVID-19

## 2020-09-05 NOTE — PROGRESS NOTES
[address] [facility]        NAME: Sailaja Hilton is a 53 y o  male  : 1965    MRN: [id number]  DATE: 2020  TIME: 11:54 AM    Assessment and Plan   Screening for viral disease [Z11 59]  1  Screening for viral disease  Novel Coronavirus (COVID-19), PCR [facility] - Collected at [facility] measures reviewed  Patient instructed to call for results of COVID-19 testing  He verbalized understanding and agreement with plan  Patient Instructions     Please remain at home in quarantine until you receive the results of your COVID-19 testing and further instruction given  While at home you should isolate from others  Call for your test results, which should be back in 2-5 days  Please follow quarantine restrictions following travel as directed by the [facility]  Follow up with your family doctor in 3-5 days  Proceed to the ER if symptoms worsen  If you need to go to the ER please notify them of your arrival and wait in your car until further instruction is given  If you need to call 911 please notify the  of current suspicion for COVID-19  Chief Complaint     Chief Complaint   Patient presents with   Clifm Geoff COVID-19     Recent travel to [address] [place] [address] wants a covid test to return to work  History of Present Illness       52 y/o male presenting for COVID-19 testing  Patient states he requires testing to return to work after traveling to [address] [place] [address] for vacation  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 and that his employer has asked to waive the 14 day quarantine restriction that then state has placed  He denies any symptoms of fever, chills, sweats, cough, SOB, wheezing, nausea, vomiting, diarrhea, loss of taste or smell, body aches, headache or dizziness  Denies any known sick contacts  Review of Systems   Review of Systems   Constitutional: Negative for chills, fatigue and fever     HENT: Negative for congestion, ear pain, rhinorrhea and sore throat  Respiratory: Negative for cough, shortness of breath and wheezing  Cardiovascular: Negative for chest pain and palpitations  Gastrointestinal: Negative for abdominal pain, diarrhea, nausea and vomiting  Musculoskeletal: Negative for arthralgias and myalgias  Skin: Negative for rash  Neurological: Negative for dizziness and headaches  Current Medications       Current Outpatient Medications:     Multiple Vitamin (VITAMIN E/FOLIC PMRN/F-7/B-61 PO), Take by mouth daily, Disp: , Rfl:     predniSONE 10 mg tablet, Take 15 mg by mouth daily, Disp: , Rfl:     predniSONE 5 mg tablet, Take 5 mg by mouth daily, Disp: , Rfl:     pyridoxine (VITAMIN B6) 50 mg tablet, Take 50 mg by mouth daily, Disp: , Rfl:     valsartan (DIOVAN) 320 MG tablet, Take 320 mg by mouth daily, Disp: , Rfl:     ketorolac (TORADOL) 10 mg tablet, Take 1 tablet (10 mg total) by mouth every 6 (six) hours as needed for moderate pain Paper Script provided to patient by Dr Yue Resendiz   (Patient not taking: Reported on 9/5/2020), Disp: 20 tablet, Rfl: 0    Current Allergies     Allergies as of 09/05/2020    (No Known Allergies)            The following portions of the patient's history were reviewed and updated as appropriate: allergies, current medications, past family history, past medical history, past social history, past surgical history and problem list      Past Medical History:   Diagnosis Date    Arthritis     Hypertension     Kidney stone     Kidney stones     Umbilical hernia        Past Surgical History:   Procedure Laterality Date    COLONOSCOPY      CYSTOSCOPY      FL RETROGRADE PYELOGRAM  7/9/2020    FL RETROGRADE PYELOGRAM  7/23/2020    [place] CYSTO/URETERO W/LITHOTRIPSY &INDWELL STENT INSRT Right 7/9/2020    Procedure: CYSTOSCOPY WITH RIGHT STENT PLACEMENT;  Surgeon: Dariusz Fagan MD;  Location: [address] [place] [address];  Service: Urology    SD CYSTO/URETERO/PYELOSCOPY W/LITHOTRIPSY Right 7/23/2020    Procedure: CYSTOSCOPY, URETEROSCOPY, LASER, AND STENT;  Surgeon: Nata Gutierrez MD;  Location: [address] [place] [address];  Service: Urology       Family History   Problem Relation Age of Onset    No Known Problems Mother     Splenomegaly Father     Thrombocytopenia Father     Thrombocytopenia Brother      Medications have been verified  Objective   Pulse 70   Temp (!) 95 4 °F (35 2 °C)   SpO2 95%        Physical Exam     Physical Exam  Vitals signs and nursing note reviewed  Constitutional:       General: He is not in acute distress  Appearance: He is well-developed  He is not ill-appearing or diaphoretic  HENT:      Head: Normocephalic and atraumatic  Eyes:      General: Lids are normal          Right eye: No discharge  Left eye: No discharge  Conjunctiva/sclera: Conjunctivae normal       Right eye: Right conjunctiva is not injected  No chemosis or exudate  Left eye: Left conjunctiva is not injected  No chemosis or exudate  Cardiovascular:      Rate and Rhythm: Normal rate and regular rhythm  Heart sounds: Normal heart sounds  Heart sounds not distant  Pulmonary:      Effort: Pulmonary effort is normal  No respiratory distress  Breath sounds: Normal breath sounds  No stridor  No decreased breath sounds, wheezing, rhonchi or rales  Skin:     General: Skin is warm and dry  Coloration: Skin is not pale  Findings: No erythema or rash  Neurological:      General: No focal deficit present  Mental Status: He is alert  He is not disoriented  Gait: Gait normal    Psychiatric:         Behavior: Behavior normal  Behavior is cooperative  Thought Content:  Thought content normal          Judgment: Judgment normal

## 2020-09-07 LAB — SARS-COV-2 RNA SPEC QL NAA+PROBE: NOT DETECTED

## 2021-02-22 ENCOUNTER — TELEPHONE (OUTPATIENT)
Dept: GASTROENTEROLOGY | Facility: CLINIC | Age: 56
End: 2021-02-22

## 2021-02-22 NOTE — TELEPHONE ENCOUNTER
Pt celina to schedule a colon, however, he has not been seen in past by one of our doctors  I returned call, celina apologizing for missing his call and asked him to please call back

## 2021-03-02 ENCOUNTER — OFFICE VISIT (OUTPATIENT)
Dept: GASTROENTEROLOGY | Facility: CLINIC | Age: 56
End: 2021-03-02
Payer: COMMERCIAL

## 2021-03-02 VITALS
WEIGHT: 228 LBS | SYSTOLIC BLOOD PRESSURE: 152 MMHG | HEIGHT: 73 IN | HEART RATE: 74 BPM | BODY MASS INDEX: 30.22 KG/M2 | DIASTOLIC BLOOD PRESSURE: 83 MMHG

## 2021-03-02 DIAGNOSIS — Z12.11 COLON CANCER SCREENING: Primary | ICD-10-CM

## 2021-03-02 DIAGNOSIS — K21.9 GASTROESOPHAGEAL REFLUX DISEASE WITHOUT ESOPHAGITIS: ICD-10-CM

## 2021-03-02 PROCEDURE — 99203 OFFICE O/P NEW LOW 30 MIN: CPT | Performed by: INTERNAL MEDICINE

## 2021-03-02 NOTE — PROGRESS NOTES
[facility] - Outpatient Consultation  Pedro Luis Dewey 63 y o  male MRN: [id number]  Encounter: [id number]        ASSESSMENT AND PLAN:      1  Colon cancer screening   will plan colonoscopy at his convenience  Discussed the procedure in preparation in detail  - Colonoscopy; Future  - PAT Covid Screening; Future    2  Gastroesophageal reflux disease without esophagitis   given longstanding GERD symptoms will screen for Benedict's esophagus  Continue management with dietary discretion and OTC antacids as needed  - EGD; Future    ______________________________________________________________________    HPI:  Patient with history of longstanding GERD previously on PPI but now treated with dietary modification occasional over-the-counter antacids presents to discuss colorectal cancer screening  Family history of colon polyps but no colorectal cancer  Patient has had occasional intermittent bright red bleeding for many years attributed to hemorrhoids  No prior colonoscopy but did have an upper endoscopy 15-20 years ago with no significant findings per his report      REVIEW OF SYSTEMS:    Review of Systems   Genitourinary:        Hx kidney stone   All other systems reviewed and are negative         Historical Information   Past Medical History:   Diagnosis Date    Arthritis     Hypertension     Kidney stone     Kidney stones     Umbilical hernia      Past Surgical History:   Procedure Laterality Date    CYSTOSCOPY      FL RETROGRADE PYELOGRAM  7/9/2020    FL RETROGRADE PYELOGRAM  7/23/2020    CA CYSTO/URETERO W/LITHOTRIPSY &INDWELL STENT INSRT Right 7/9/2020    Procedure: CYSTOSCOPY WITH RIGHT STENT PLACEMENT;  Surgeon: Emily Nash MD;  Location: [address] [place] [address];  Service: Urology    CA CYSTO/URETERO/PYELOSCOPY W/LITHOTRIPSY Right 7/23/2020    Procedure: CYSTOSCOPY, URETEROSCOPY, LASER, AND STENT;  Surgeon: Emily Nash MD;  Location: [address] [place] [address];  Service: Urology    UPPER GASTROINTESTINAL ENDOSCOPY       Social History   Social History     Substance and Sexual Activity   Alcohol Use Yes    Frequency: 2-4 times a month    Drinks per session: 1 or 2    Binge frequency: Never    Comment: social 1-2 drinks a week at most, not consistently     Social History     Substance and Sexual Activity   Drug Use Never     Social History     Tobacco Use   Smoking Status Never Smoker   Smokeless Tobacco Never Used     Family History   Problem Relation Age of Onset    No Known Problems Mother     Splenomegaly Father     Thrombocytopenia Father     Thrombocytopenia Brother        Meds/Allergies       Current Outpatient Medications:     Multiple Vitamin (VITAMIN E/FOLIC CYCH/I-8/X-89 PO)    pyridoxine (VITAMIN B6) 50 mg tablet    valsartan (DIOVAN) 320 MG tablet    ketorolac (TORADOL) 10 mg tablet    predniSONE 10 mg tablet    predniSONE 5 mg tablet    VALSARTAN PO    No Known Allergies        Objective     Blood pressure 152/83, pulse 74, height 6' 1" (1 854 m), weight 103 kg (228 lb)  Body mass index is 30 08 kg/m²  PHYSICAL EXAM:      Physical Exam  Vitals signs and nursing note reviewed  Constitutional:       General: He is not in acute distress  HENT:      Head: Normocephalic and atraumatic  Mouth/Throat:      Mouth: Mucous membranes are moist    Eyes:      General: No scleral icterus  Pupils: Pupils are equal, round, and reactive to light  Neck:      Musculoskeletal: Normal range of motion and neck supple  Cardiovascular:      Rate and Rhythm: Normal rate and regular rhythm  Pulmonary:      Effort: Pulmonary effort is normal  No respiratory distress  Abdominal:      General: There is no distension  Palpations: Abdomen is soft  Musculoskeletal: Normal range of motion  Skin:     General: Skin is warm and dry  Neurological:      General: No focal deficit present  Mental Status: He is alert and oriented to person, place, and time     Psychiatric: Mood and Affect: Mood normal          Behavior: Behavior normal               Lab Results:   No visits with results within 1 Day(s) from this visit  Latest known visit with results is:   Orders Only on 09/05/2020   Component Date Value    SARS-CoV-2  09/05/2020 Not Detected          Radiology Results:   No results found

## 2021-03-02 NOTE — H&P (VIEW-ONLY)
[facility] - Outpatient Consultation  Rozina Saleem 63 y o  male MRN: [id number]  Encounter: [id number]        ASSESSMENT AND PLAN:      1  Colon cancer screening   will plan colonoscopy at his convenience  Discussed the procedure in preparation in detail  - Colonoscopy; Future  - PAT Covid Screening; Future    2  Gastroesophageal reflux disease without esophagitis   given longstanding GERD symptoms will screen for Benedict's esophagus  Continue management with dietary discretion and OTC antacids as needed  - EGD; Future    ______________________________________________________________________    HPI:  Patient with history of longstanding GERD previously on PPI but now treated with dietary modification occasional over-the-counter antacids presents to discuss colorectal cancer screening  Family history of colon polyps but no colorectal cancer  Patient has had occasional intermittent bright red bleeding for many years attributed to hemorrhoids  No prior colonoscopy but did have an upper endoscopy 15-20 years ago with no significant findings per his report      REVIEW OF SYSTEMS:    Review of Systems   Genitourinary:        Hx kidney stone   All other systems reviewed and are negative         Historical Information   Past Medical History:   Diagnosis Date    Arthritis     Hypertension     Kidney stone     Kidney stones     Umbilical hernia      Past Surgical History:   Procedure Laterality Date    CYSTOSCOPY      FL RETROGRADE PYELOGRAM  7/9/2020    FL RETROGRADE PYELOGRAM  7/23/2020    [place] CYSTO/URETERO W/LITHOTRIPSY &INDWELL STENT INSRT Right 7/9/2020    Procedure: CYSTOSCOPY WITH RIGHT STENT PLACEMENT;  Surgeon: Jairo Clarke MD;  Location: [address] [place] [address];  Service: Urology    GA CYSTO/URETERO/PYELOSCOPY W/LITHOTRIPSY Right 7/23/2020    Procedure: CYSTOSCOPY, URETEROSCOPY, LASER, AND STENT;  Surgeon: Jairo Clarke MD;  Location: [address] [place] [address];  Service: Urology    UPPER GASTROINTESTINAL ENDOSCOPY       Social History   Social History     Substance and Sexual Activity   Alcohol Use Yes    Frequency: 2-4 times a month    Drinks per session: 1 or 2    Binge frequency: Never    Comment: social 1-2 drinks a week at most, not consistently     Social History     Substance and Sexual Activity   Drug Use Never     Social History     Tobacco Use   Smoking Status Never Smoker   Smokeless Tobacco Never Used     Family History   Problem Relation Age of Onset    No Known Problems Mother     Splenomegaly Father     Thrombocytopenia Father     Thrombocytopenia Brother        Meds/Allergies       Current Outpatient Medications:     Multiple Vitamin (VITAMIN E/FOLIC ARRZ/G-2/K-03 PO)    pyridoxine (VITAMIN B6) 50 mg tablet    valsartan (DIOVAN) 320 MG tablet    ketorolac (TORADOL) 10 mg tablet    predniSONE 10 mg tablet    predniSONE 5 mg tablet    VALSARTAN PO    No Known Allergies        Objective     Blood pressure 152/83, pulse 74, height 6' 1" (1 854 m), weight 103 kg (228 lb)  Body mass index is 30 08 kg/m²  PHYSICAL EXAM:      Physical Exam  Vitals signs and nursing note reviewed  Constitutional:       General: He is not in acute distress  HENT:      Head: Normocephalic and atraumatic  Mouth/Throat:      Mouth: Mucous membranes are moist    Eyes:      General: No scleral icterus  Pupils: Pupils are equal, round, and reactive to light  Neck:      Musculoskeletal: Normal range of motion and neck supple  Cardiovascular:      Rate and Rhythm: Normal rate and regular rhythm  Pulmonary:      Effort: Pulmonary effort is normal  No respiratory distress  Abdominal:      General: There is no distension  Palpations: Abdomen is soft  Musculoskeletal: Normal range of motion  Skin:     General: Skin is warm and dry  Neurological:      General: No focal deficit present  Mental Status: He is alert and oriented to person, place, and time     Psychiatric: Mood and Affect: Mood normal          Behavior: Behavior normal               Lab Results:   No visits with results within 1 Day(s) from this visit  Latest known visit with results is:   Orders Only on 09/05/2020   Component Date Value    SARS-CoV-2  09/05/2020 Not Detected          Radiology Results:   No results found

## 2021-03-09 DIAGNOSIS — Z12.11 COLON CANCER SCREENING: ICD-10-CM

## 2021-03-09 PROCEDURE — U0005 INFEC AGEN DETEC AMPLI PROBE: HCPCS | Performed by: INTERNAL MEDICINE

## 2021-03-09 PROCEDURE — U0003 INFECTIOUS AGENT DETECTION BY NUCLEIC ACID (DNA OR RNA); SEVERE ACUTE RESPIRATORY SYNDROME CORONAVIRUS 2 (SARS-COV-2) (CORONAVIRUS DISEASE [COVID-19]), AMPLIFIED PROBE TECHNIQUE, MAKING USE OF HIGH THROUGHPUT TECHNOLOGIES AS DESCRIBED BY CMS-2020-01-R: HCPCS | Performed by: INTERNAL MEDICINE

## 2021-03-10 LAB — SARS-COV-2 RNA RESP QL NAA+PROBE: NEGATIVE

## 2021-03-10 RX ORDER — PROPRANOLOL/HYDROCHLOROTHIAZID 40 MG-25MG
TABLET ORAL EVERY MORNING
COMMUNITY
End: 2021-07-06

## 2021-03-10 RX ORDER — POLYETHYLENE GLYCOL 3350 17 G/17G
17 POWDER, FOR SOLUTION ORAL ONCE
COMMUNITY
End: 2021-07-06

## 2021-03-10 NOTE — PRE-PROCEDURE INSTRUCTIONS
Pre-Surgery Instructions:   Medication Instructions    bisacodyl (DULCOLAX) 5 mg EC tablet Patient was instructed by Physician and understands   polyethylene glycol (GLYCOLAX) 17 GM/SCOOP powder Patient was instructed by Physician and understands   pyridoxine (VITAMIN B6) 50 mg tablet Instructed patient per Anesthesia Guidelines   Turmeric 500 MG CAPS Instructed patient per Anesthesia Guidelines   valsartan (DIOVAN) 320 MG tablet Instructed patient per Anesthesia Guidelines  Pt to follow Dr Kd Hough instructions    wife Daniella Hurst [phone]

## 2021-03-11 RX ORDER — FOLIC ACID 1 MG/1
1 TABLET ORAL EVERY MORNING
COMMUNITY

## 2021-03-15 ENCOUNTER — ANESTHESIA EVENT (OUTPATIENT)
Dept: GASTROENTEROLOGY | Facility: AMBULARY SURGERY CENTER | Age: 56
End: 2021-03-15

## 2021-03-15 ENCOUNTER — ANESTHESIA (OUTPATIENT)
Dept: GASTROENTEROLOGY | Facility: AMBULARY SURGERY CENTER | Age: 56
End: 2021-03-15

## 2021-03-15 ENCOUNTER — HOSPITAL ENCOUNTER (OUTPATIENT)
Dept: GASTROENTEROLOGY | Facility: AMBULARY SURGERY CENTER | Age: 56
Setting detail: OUTPATIENT SURGERY
Discharge: HOME | End: 2021-03-15
Attending: INTERNAL MEDICINE | Admitting: INTERNAL MEDICINE
Payer: COMMERCIAL

## 2021-03-15 VITALS
RESPIRATION RATE: 18 BRPM | DIASTOLIC BLOOD PRESSURE: 84 MMHG | OXYGEN SATURATION: 96 % | TEMPERATURE: 97.3 F | HEART RATE: 72 BPM | WEIGHT: 228 LBS | BODY MASS INDEX: 30.22 KG/M2 | SYSTOLIC BLOOD PRESSURE: 132 MMHG | HEIGHT: 73 IN

## 2021-03-15 DIAGNOSIS — K21.9 GASTROESOPHAGEAL REFLUX DISEASE WITHOUT ESOPHAGITIS: ICD-10-CM

## 2021-03-15 DIAGNOSIS — Z12.11 COLON CANCER SCREENING: ICD-10-CM

## 2021-03-15 DIAGNOSIS — K21.00 GASTROESOPHAGEAL REFLUX DISEASE WITH ESOPHAGITIS, UNSPECIFIED WHETHER HEMORRHAGE: Primary | ICD-10-CM

## 2021-03-15 PROCEDURE — 88313 SPECIAL STAINS GROUP 2: CPT | Performed by: PATHOLOGY

## 2021-03-15 PROCEDURE — 88305 TISSUE EXAM BY PATHOLOGIST: CPT | Performed by: PATHOLOGY

## 2021-03-15 PROCEDURE — G0121 COLON CA SCRN NOT HI RSK IND: HCPCS | Performed by: INTERNAL MEDICINE

## 2021-03-15 PROCEDURE — 43239 EGD BIOPSY SINGLE/MULTIPLE: CPT | Performed by: INTERNAL MEDICINE

## 2021-03-15 RX ORDER — SODIUM CHLORIDE, SODIUM LACTATE, POTASSIUM CHLORIDE, CALCIUM CHLORIDE 600; 310; 30; 20 MG/100ML; MG/100ML; MG/100ML; MG/100ML
125 INJECTION, SOLUTION INTRAVENOUS CONTINUOUS
Status: CANCELLED | OUTPATIENT
Start: 2021-03-15

## 2021-03-15 RX ORDER — SODIUM CHLORIDE, SODIUM LACTATE, POTASSIUM CHLORIDE, CALCIUM CHLORIDE 600; 310; 30; 20 MG/100ML; MG/100ML; MG/100ML; MG/100ML
INJECTION, SOLUTION INTRAVENOUS CONTINUOUS PRN
Status: DISCONTINUED | OUTPATIENT
Start: 2021-03-15 | End: 2021-03-15

## 2021-03-15 RX ORDER — SODIUM CHLORIDE, SODIUM LACTATE, POTASSIUM CHLORIDE, CALCIUM CHLORIDE 600; 310; 30; 20 MG/100ML; MG/100ML; MG/100ML; MG/100ML
20 INJECTION, SOLUTION INTRAVENOUS CONTINUOUS
Status: CANCELLED | OUTPATIENT
Start: 2021-03-15

## 2021-03-15 RX ORDER — ONDANSETRON 2 MG/ML
4 INJECTION INTRAMUSCULAR; INTRAVENOUS ONCE AS NEEDED
Status: CANCELLED | OUTPATIENT
Start: 2021-03-15

## 2021-03-15 RX ORDER — PANTOPRAZOLE SODIUM 20 MG/1
20 TABLET, DELAYED RELEASE ORAL DAILY
Qty: 30 TABLET | Refills: 3 | Status: SHIPPED | OUTPATIENT
Start: 2021-03-15 | End: 2021-07-06

## 2021-03-15 RX ORDER — PROPOFOL 10 MG/ML
INJECTION, EMULSION INTRAVENOUS CONTINUOUS PRN
Status: DISCONTINUED | OUTPATIENT
Start: 2021-03-15 | End: 2021-03-15

## 2021-03-15 RX ORDER — PROPOFOL 10 MG/ML
INJECTION, EMULSION INTRAVENOUS AS NEEDED
Status: DISCONTINUED | OUTPATIENT
Start: 2021-03-15 | End: 2021-03-15

## 2021-03-15 RX ORDER — LIDOCAINE HYDROCHLORIDE 10 MG/ML
INJECTION, SOLUTION EPIDURAL; INFILTRATION; INTRACAUDAL; PERINEURAL AS NEEDED
Status: DISCONTINUED | OUTPATIENT
Start: 2021-03-15 | End: 2021-03-15

## 2021-03-15 RX ADMIN — LIDOCAINE HYDROCHLORIDE 50 MG: 10 INJECTION, SOLUTION EPIDURAL; INFILTRATION; INTRACAUDAL; PERINEURAL at 09:20

## 2021-03-15 RX ADMIN — SODIUM CHLORIDE, SODIUM LACTATE, POTASSIUM CHLORIDE, AND CALCIUM CHLORIDE: .6; .31; .03; .02 INJECTION, SOLUTION INTRAVENOUS at 09:16

## 2021-03-15 RX ADMIN — PROPOFOL 100 MG: 10 INJECTION, EMULSION INTRAVENOUS at 09:20

## 2021-03-15 RX ADMIN — PROPOFOL 120 MCG/KG/MIN: 10 INJECTION, EMULSION INTRAVENOUS at 09:21

## 2021-03-15 RX ADMIN — PROPOFOL 50 MG: 10 INJECTION, EMULSION INTRAVENOUS at 09:21

## 2021-03-15 NOTE — PERIOPERATIVE NURSING NOTE
Pt awake & alert & expressing understanding to discharge instructions, no complaints of pain  Tolerating beverage uneventfully

## 2021-03-15 NOTE — ANESTHESIA POSTPROCEDURE EVALUATION
Post-Op Assessment Note    CV Status:  Stable  Pain Score: 0    Pain management: adequate     Mental Status:  Sleepy   Hydration Status:  Stable   PONV Controlled:  None   Airway Patency:  Patent   Two or more mitigation strategies used for obstructive sleep apnea   Post Op Vitals Reviewed: Yes      Staff: CRNA         No complications documented      BP   133/83   Temp  98   Pulse  82   Resp   16   SpO2   99

## 2021-03-15 NOTE — INTERVAL H&P NOTE
H&P reviewed  After examining the patient I find no changes in the patients condition since the H&P had been written      Vitals:    03/15/21 0823   BP: 136/82   Pulse: 76   Resp: 18   Temp: (!) 97 3 °F (36 3 °C)   SpO2: 99%

## 2021-03-15 NOTE — ANESTHESIA PREPROCEDURE EVALUATION
Procedure:  COLONOSCOPY  EGD    Relevant Problems   CARDIO   (+) Essential hypertension      GI/HEPATIC   (+) Nonalcoholic steatohepatitis      /RENAL   (+) Right nephrolithiasis      MUSCULOSKELETAL   (+) Inflammatory arthritis      Other   (+) Splenomegaly        Physical Exam    Airway    Mallampati score: II  TM Distance: >3 FB  Neck ROM: full     Dental   No notable dental hx     Cardiovascular  Cardiovascular exam normal    Pulmonary  Pulmonary exam normal     Other Findings        Anesthesia Plan  ASA Score- 2     Anesthesia Type- IV sedation with anesthesia with ASA Monitors  Additional Monitors:   Airway Plan:           Plan Factors-Exercise tolerance (METS): >4 METS  Chart reviewed  EKG reviewed  Imaging results reviewed  Existing labs reviewed  Patient summary reviewed  Patient is not a current smoker  Induction-     Postoperative Plan-     Informed Consent- Anesthetic plan and risks discussed with patient  I personally reviewed this patient with the CRNA  Discussed and agreed on the Anesthesia Plan with the CRNA  Rianna Giang

## 2021-04-05 ENCOUNTER — TELEPHONE (OUTPATIENT)
Dept: GASTROENTEROLOGY | Facility: CLINIC | Age: 56
End: 2021-04-05

## 2021-05-11 ENCOUNTER — OFFICE VISIT (OUTPATIENT)
Dept: GASTROENTEROLOGY | Facility: CLINIC | Age: 56
End: 2021-05-11
Payer: COMMERCIAL

## 2021-05-11 VITALS
HEIGHT: 73 IN | HEART RATE: 72 BPM | BODY MASS INDEX: 31.54 KG/M2 | WEIGHT: 238 LBS | SYSTOLIC BLOOD PRESSURE: 140 MMHG | DIASTOLIC BLOOD PRESSURE: 77 MMHG

## 2021-05-11 DIAGNOSIS — K25.9 GASTRIC ULCER WITHOUT HEMORRHAGE OR PERFORATION, UNSPECIFIED CHRONICITY: Primary | ICD-10-CM

## 2021-05-11 DIAGNOSIS — K21.00 GASTROESOPHAGEAL REFLUX DISEASE WITH ESOPHAGITIS WITHOUT HEMORRHAGE: ICD-10-CM

## 2021-05-11 PROCEDURE — 99214 OFFICE O/P EST MOD 30 MIN: CPT | Performed by: INTERNAL MEDICINE

## 2021-05-11 NOTE — PROGRESS NOTES
[facility] - Outpatient Follow-up Note  Jeanine Membreno 63 y o  male MRN: [id number]  Encounter: [id number]          ASSESSMENT AND PLAN:      1  Gastric ulcer without hemorrhage or perforation, unspecified chronicity   biopsies negative for H pylori  Denies NSAIDs  Possibly secondary to methotrexate use  Will check H pylori fecal antigen given relatively low sensitivity of biopsies and confirm healing with repeat EGD in approximately 6-8 weeks  Continue pantoprazole     - H  pylori antigen, stool; Future  - EGD; Future  - PAT Covid Screening; Future    2  Gastroesophageal reflux disease with esophagitis without hemorrhage    We discussed diet, medications      3  Colorectal cancer screening  Normal colonoscopy  Repeat in 10 years    ______________________________________________________________________    SUBJECTIVE:  Returns in follow-up of recent EGD which time he was noted to have esophagitis and several small gastric ulcers  Biopsies were unrevealing  Has started pantoprazole 20 mg daily and is feeling markedly improved with  Resolution of his GERD symptoms  Takes no NSAIDs  Does take methotrexate for rheumatoid arthritis  Reports his rheumatologist is following his LFTs    REVIEW OF SYSTEMS:    Review of Systems   Musculoskeletal: Positive for arthritis            Historical Information   Past Medical History:   Diagnosis Date    Arthritis     Hypertension     Kidney stone     Kidney stones     RA (rheumatoid arthritis) ([address] )     Snores     Umbilical hernia     Wears glasses      Past Surgical History:   Procedure Laterality Date    CYSTOSCOPY      FL RETROGRADE PYELOGRAM  7/9/2020    FL RETROGRADE PYELOGRAM  7/23/2020    [place] CYSTO/URETERO W/LITHOTRIPSY &INDWELL STENT INSRT Right 7/9/2020    Procedure: CYSTOSCOPY WITH RIGHT STENT PLACEMENT;  Surgeon: Letty Cason MD;  Location: [facility];  Service: Urology    LA CYSTO/URETERO/PYELOSCOPY W/LITHOTRIPSY Right 7/23/2020    Procedure: CYSTOSCOPY, URETEROSCOPY, LASER, AND STENT;  Surgeon: Barbra Gutierrez MD;  Location: [facility];  Service: Urology    UPPER GASTROINTESTINAL ENDOSCOPY      WISDOM TOOTH EXTRACTION       Social History   Social History     Substance and Sexual Activity   Alcohol Use Yes    Frequency: 2-4 times a month    Drinks per session: 1 or 2    Binge frequency: Never    Comment: social 1-2 drinks a week at most, not consistently     Social History     Substance and Sexual Activity   Drug Use Never     Social History     Tobacco Use   Smoking Status Never Smoker   Smokeless Tobacco Never Used     Family History   Problem Relation Age of Onset    Alzheimer's disease Mother     Splenomegaly Father     Thrombocytopenia Father     Thrombocytopenia Brother        Meds/Allergies       Current Outpatient Medications:     bisacodyl (DULCOLAX) 5 mg EC tablet    folic acid (FOLVITE) 1 mg tablet    methotrexate 2 5 mg tablet    pantoprazole (PROTONIX) 20 mg tablet    polyethylene glycol (GLYCOLAX) 17 GM/SCOOP powder    pyridoxine (VITAMIN B6) 50 mg tablet    Turmeric 500 MG CAPS    valsartan (DIOVAN) 320 MG tablet    No Known Allergies        Objective     Blood pressure 140/77, pulse 72, height 6' 1" (1 854 m), weight 108 kg (238 lb)  Body mass index is 31 4 kg/m²  PHYSICAL EXAM:      Physical Exam  Vitals signs and nursing note reviewed  Constitutional:       General: He is not in acute distress  HENT:      Head: Normocephalic and atraumatic  Mouth/Throat:      Mouth: Mucous membranes are moist    Eyes:      General: No scleral icterus  Pupils: Pupils are equal, round, and reactive to light  Neck:      Musculoskeletal: Normal range of motion and neck supple  Cardiovascular:      Rate and Rhythm: Normal rate and regular rhythm  Pulmonary:      Effort: Pulmonary effort is normal  No respiratory distress  Abdominal:      General: There is no distension        Palpations: Abdomen is soft  Musculoskeletal: Normal range of motion  Skin:     General: Skin is warm and dry  Neurological:      General: No focal deficit present  Mental Status: He is alert and oriented to person, place, and time  Psychiatric:         Mood and Affect: Mood normal          Behavior: Behavior normal           Lab Results:   No visits with results within 1 Day(s) from this visit  Latest known visit with results is:   Hospital Outpatient Visit on 03/15/2021   Component Date Value    Case Report 03/15/2021                      Value:Surgical Pathology Report                         Case: [id number]                                   Authorizing Provider:  Brooke Wylie MD         Collected:           03/15/2021 3243              Ordering Location:     [facility]   Received:            03/15/2021 [address]                                                                       Pathologist:           Sherin Washington MD                                                          Specimens:   A) - Stomach, biospy antrum r/o h pylori                                                            B) - Stomach, GE junction cold biopsy r/o neoplasm                                         Final Diagnosis 03/15/2021                      Value: This result contains rich text formatting which cannot be displayed here   Additional Information 03/15/2021                      Value: This result contains rich text formatting which cannot be displayed here  Amy Méndez Gross Description 03/15/2021                      Value: This result contains rich text formatting which cannot be displayed here  Radiology Results:   No results found

## 2021-06-22 ENCOUNTER — LAB (OUTPATIENT)
Dept: LAB | Facility: CLINIC | Age: 56
End: 2021-06-22
Payer: COMMERCIAL

## 2021-06-22 DIAGNOSIS — K25.9 GASTRIC ULCER WITHOUT HEMORRHAGE OR PERFORATION, UNSPECIFIED CHRONICITY: ICD-10-CM

## 2021-06-22 PROCEDURE — 87338 HPYLORI STOOL AG IA: CPT

## 2021-06-23 LAB — H PYLORI AG STL QL IA: NEGATIVE

## 2021-07-06 ENCOUNTER — ANESTHESIA (OUTPATIENT)
Dept: GASTROENTEROLOGY | Facility: AMBULATORY SURGERY CENTER | Age: 56
End: 2021-07-06

## 2021-07-06 ENCOUNTER — HOSPITAL ENCOUNTER (OUTPATIENT)
Dept: GASTROENTEROLOGY | Facility: AMBULATORY SURGERY CENTER | Age: 56
Discharge: HOME | End: 2021-07-06
Payer: COMMERCIAL

## 2021-07-06 ENCOUNTER — ANESTHESIA EVENT (OUTPATIENT)
Dept: GASTROENTEROLOGY | Facility: AMBULATORY SURGERY CENTER | Age: 56
End: 2021-07-06

## 2021-07-06 VITALS
DIASTOLIC BLOOD PRESSURE: 77 MMHG | HEART RATE: 73 BPM | WEIGHT: 225 LBS | BODY MASS INDEX: 29.82 KG/M2 | OXYGEN SATURATION: 98 % | TEMPERATURE: 96.1 F | RESPIRATION RATE: 18 BRPM | HEIGHT: 73 IN | SYSTOLIC BLOOD PRESSURE: 129 MMHG

## 2021-07-06 DIAGNOSIS — K25.9 GASTRIC ULCER WITHOUT HEMORRHAGE OR PERFORATION, UNSPECIFIED CHRONICITY: ICD-10-CM

## 2021-07-06 PROBLEM — M06.9 RA (RHEUMATOID ARTHRITIS) (HCC): Status: ACTIVE | Noted: 2020-07-09

## 2021-07-06 PROCEDURE — 43235 EGD DIAGNOSTIC BRUSH WASH: CPT | Performed by: INTERNAL MEDICINE

## 2021-07-06 PROCEDURE — 00731 ANES UPR GI NDSC PX NOS: CPT | Performed by: NURSE ANESTHETIST, CERTIFIED REGISTERED

## 2021-07-06 RX ORDER — PROPOFOL 10 MG/ML
INJECTION, EMULSION INTRAVENOUS AS NEEDED
Status: DISCONTINUED | OUTPATIENT
Start: 2021-07-06 | End: 2021-07-06

## 2021-07-06 RX ORDER — SODIUM CHLORIDE 9 MG/ML
20 INJECTION, SOLUTION INTRAVENOUS CONTINUOUS
Status: DISCONTINUED | OUTPATIENT
Start: 2021-07-06 | End: 2021-07-10 | Stop reason: HOSPADM

## 2021-07-06 RX ORDER — LIDOCAINE HYDROCHLORIDE 20 MG/ML
INJECTION, SOLUTION EPIDURAL; INFILTRATION; INTRACAUDAL; PERINEURAL AS NEEDED
Status: DISCONTINUED | OUTPATIENT
Start: 2021-07-06 | End: 2021-07-06

## 2021-07-06 RX ORDER — SODIUM CHLORIDE 9 MG/ML
30 INJECTION, SOLUTION INTRAVENOUS CONTINUOUS
Status: DISCONTINUED | OUTPATIENT
Start: 2021-07-06 | End: 2021-07-10 | Stop reason: HOSPADM

## 2021-07-06 RX ADMIN — LIDOCAINE HYDROCHLORIDE 50 MG: 20 INJECTION, SOLUTION EPIDURAL; INFILTRATION; INTRACAUDAL; PERINEURAL at 08:07

## 2021-07-06 RX ADMIN — PROPOFOL 150 MG: 10 INJECTION, EMULSION INTRAVENOUS at 08:07

## 2021-07-06 RX ADMIN — SODIUM CHLORIDE: 9 INJECTION, SOLUTION INTRAVENOUS at 08:03

## 2021-07-06 RX ADMIN — PROPOFOL 50 MG: 10 INJECTION, EMULSION INTRAVENOUS at 08:10

## 2021-07-06 RX ADMIN — PROPOFOL 50 MG: 10 INJECTION, EMULSION INTRAVENOUS at 08:09

## 2021-07-06 NOTE — ANESTHESIA POSTPROCEDURE EVALUATION
Post-Op Assessment Note    CV Status:  Stable  Pain Score: 0    Pain management: adequate     Mental Status:  Alert and awake   Hydration Status:  Stable   PONV Controlled:  None   Airway Patency:  Patent      Post Op Vitals Reviewed: Yes      Staff: CRNA         No complications documented      /70 (07/06/21 0823)    Temp     Pulse 67 (07/06/21 0823)   Resp 18 (07/06/21 0823)    SpO2 98 % (07/06/21 0823)

## 2021-07-06 NOTE — H&P
History and Physical - [facility]  Reece Mcdaniel 63 y o  male MRN: [id number]                  HPI: Reece Mcdaniel is a 63y o  year old male who presents for EGD, Hx PUD       REVIEW OF SYSTEMS: Per the HPI, and otherwise unremarkable      Historical Information   Past Medical History:   Diagnosis Date    Arthritis     Gastric ulcer 03/2021    hx of gastric ulcer, here for recheck EGD    Hypertension 07/06/2021    BP med discontinued by cardiologist, pt monitors BP on daily basis, borderline hypertension    Kidney stone     Kidney stones     RA (rheumatoid arthritis) ([address] )     Snores     Umbilical hernia     Wears glasses      Past Surgical History:   Procedure Laterality Date    COLONOSCOPY      CYSTOSCOPY      FL RETROGRADE PYELOGRAM  7/9/2020    FL RETROGRADE PYELOGRAM  7/23/2020    MD CYSTO/URETERO W/LITHOTRIPSY &INDWELL STENT INSRT Right 7/9/2020    Procedure: CYSTOSCOPY WITH RIGHT STENT PLACEMENT;  Surgeon: Basilio Tuttle MD;  Location[facility];  Service: Urology    MD CYSTO/URETERO/PYELOSCOPY W/LITHOTRIPSY Right 7/23/2020    Procedure: CYSTOSCOPY, URETEROSCOPY, LASER, AND STENT;  Surgeon: Basilio Tuttle MD;  Location: [facility];  Service: Urology    UPPER GASTROINTESTINAL ENDOSCOPY      WISDOM TOOTH EXTRACTION       Social History   Social History     Substance and Sexual Activity   Alcohol Use Yes    Comment: social 1-2 drinks a week at most, not consistently     Social History     Substance and Sexual Activity   Drug Use Never     Social History     Tobacco Use   Smoking Status Never Smoker   Smokeless Tobacco Never Used     Family History   Problem Relation Age of Onset    Alzheimer's disease Mother     Splenomegaly Father     Thrombocytopenia Father     Thrombocytopenia Brother        Meds/Allergies       Current Outpatient Medications:     folic acid (FOLVITE) 1 mg tablet    methotrexate 2 5 mg tablet    Current Facility-Administered Medications:    sodium chloride 0 9 % infusion, 30 mL/hr, Intravenous, Continuous, New Bag at 07/06/21 0803    sodium chloride 0 9 % infusion, 20 mL/hr, Intravenous, Continuous    No Known Allergies    Objective     /66   Pulse 72   Temp (!) 96 1 °F (35 6 °C)   Resp 18   Ht 6' 1" (1 854 m)   Wt 102 kg (225 lb)   SpO2 98%   BMI 29 69 kg/m²       PHYSICAL EXAM    Gen: NAD  Head: NCAT  CV: RRR  CHEST: Clear  ABD: soft, NT/ND  EXT: no edema      ASSESSMENT/PLAN:  This is a 63y o  year old male here for EGD, Hx PUD, and he is stable and optimized for his procedure

## 2021-07-06 NOTE — ANESTHESIA PREPROCEDURE EVALUATION
Procedure:  EGD    Relevant Problems   ANESTHESIA (within normal limits)      CARDIO   (+) Essential hypertension      ENDO (within normal limits)      GI/HEPATIC   (+) Nonalcoholic steatohepatitis      /RENAL   (+) Right nephrolithiasis      GYN (within normal limits)      HEMATOLOGY (within normal limits)      MUSCULOSKELETAL   (+) RA (rheumatoid arthritis) (HCC)      NEURO/PSYCH (within normal limits)      PULMONARY (within normal limits)      Other   (+) Splenomegaly        Physical Exam    Airway    Mallampati score: II    Neck ROM: full     Dental   No notable dental hx     Cardiovascular  Cardiovascular exam normal    Pulmonary  Pulmonary exam normal     Other Findings        Anesthesia Plan  ASA Score- 2     Anesthesia Type- IV sedation with anesthesia with ASA Monitors  Additional Monitors:   Airway Plan:           Plan Factors-Exercise tolerance (METS): >4 METS  Chart reviewed  Patient is not a current smoker  Induction- intravenous  Postoperative Plan-     Informed Consent- Anesthetic plan and risks discussed with patient

## 2021-11-11 ENCOUNTER — HOSPITAL ENCOUNTER (OUTPATIENT)
Dept: RADIOLOGY | Facility: HOSPITAL | Age: 56
Discharge: HOME | End: 2021-11-11
Attending: SPECIALIST
Payer: COMMERCIAL

## 2021-11-11 ENCOUNTER — APPOINTMENT (OUTPATIENT)
Dept: LAB | Facility: HOSPITAL | Age: 56
End: 2021-11-11
Attending: SPECIALIST
Payer: COMMERCIAL

## 2021-11-11 DIAGNOSIS — N20.0 CALCULUS OF KIDNEY: ICD-10-CM

## 2021-11-11 DIAGNOSIS — N20.0 URIC ACID NEPHROLITHIASIS: ICD-10-CM

## 2021-11-11 LAB — PSA SERPL-MCNC: 0.6 NG/ML (ref 0–4)

## 2021-11-11 PROCEDURE — 76770 US EXAM ABDO BACK WALL COMP: CPT

## 2021-11-11 PROCEDURE — 84153 ASSAY OF PSA TOTAL: CPT

## 2021-11-11 PROCEDURE — 74018 RADEX ABDOMEN 1 VIEW: CPT

## 2022-11-03 ENCOUNTER — HOSPITAL ENCOUNTER (OUTPATIENT)
Dept: RADIOLOGY | Facility: HOSPITAL | Age: 57
Discharge: HOME | End: 2022-11-03

## 2022-11-03 ENCOUNTER — APPOINTMENT (OUTPATIENT)
Dept: LAB | Facility: HOSPITAL | Age: 57
End: 2022-11-03

## 2022-11-03 DIAGNOSIS — N20.0 RENAL CALCULUS: ICD-10-CM

## 2022-11-03 DIAGNOSIS — R35.1 NOCTURIA: ICD-10-CM

## 2022-11-03 LAB — PSA SERPL-MCNC: 0.6 NG/ML (ref 0–4)

## 2023-11-10 ENCOUNTER — HOSPITAL ENCOUNTER (EMERGENCY)
Facility: HOSPITAL | Age: 58
Discharge: HOME | End: 2023-11-10
Attending: EMERGENCY MEDICINE
Payer: COMMERCIAL

## 2023-11-10 VITALS
HEART RATE: 70 BPM | RESPIRATION RATE: 18 BRPM | HEIGHT: 73 IN | OXYGEN SATURATION: 98 % | SYSTOLIC BLOOD PRESSURE: 131 MMHG | WEIGHT: 224.43 LBS | BODY MASS INDEX: 29.74 KG/M2 | DIASTOLIC BLOOD PRESSURE: 80 MMHG | TEMPERATURE: 96.9 F
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
## 2023-11-10 PROCEDURE — 90375 RABIES IG IM/SC: CPT | Performed by: PHYSICIAN ASSISTANT

## 2023-11-10 PROCEDURE — 99284 EMERGENCY DEPT VISIT MOD MDM: CPT | Performed by: PHYSICIAN ASSISTANT

## 2023-11-10 PROCEDURE — 90675 RABIES VACCINE IM: CPT | Performed by: PHYSICIAN ASSISTANT

## 2023-11-10 PROCEDURE — 90471 IMMUNIZATION ADMIN: CPT

## 2023-11-10 PROCEDURE — 99283 EMERGENCY DEPT VISIT LOW MDM: CPT

## 2023-11-10 PROCEDURE — 96372 THER/PROPH/DIAG INJ SC/IM: CPT

## 2023-11-10 RX ORDER — LISINOPRIL 10 MG/1
10 TABLET ORAL DAILY
COMMUNITY

## 2023-11-10 RX ORDER — AMOXICILLIN AND CLAVULANATE POTASSIUM 875; 125 MG/1; MG/1
1 TABLET, FILM COATED ORAL ONCE
Status: COMPLETED | OUTPATIENT
Start: 2023-11-10 | End: 2023-11-10

## 2023-11-10 RX ADMIN — AMOXICILLIN AND CLAVULANATE POTASSIUM 1 TABLET: 875; 125 TABLET, FILM COATED ORAL at 11:09

## 2023-11-10 RX ADMIN — Medication 1 ML: at 11:10

## 2023-11-10 RX ADMIN — RABIES IMMUNE GLOBULIN (HUMAN) 2040 UNITS: 300 INJECTION, SOLUTION INFILTRATION; INTRAMUSCULAR at 11:12

## 2023-11-11 NOTE — ED PROVIDER NOTES
History  Chief Complaint   Patient presents with    Cat Scratch     Pt. Was scratched or bit by a feral cat that snuck into his house. He states cat was acting very funny. Scratches on his left   wrist.      Patient is a 58-year-old male with past medical history significant for peptic ulcer disease, hypertension, nephrolithiasis and RA who presents for evaluation of cat bite. Patient states that he has multiple stray cats living on his property. He states that would go into his home and when he went to remove it the cat scratched and bit him. He seeks rabies prophylaxis and evaluation          Prior to Admission Medications   Prescriptions Last Dose Informant Patient Reported?  Taking?   folic acid (FOLVITE) 1 mg tablet 11/10/2023 Self Yes Yes   Sig: Take 1 mg by mouth every morning   lisinopril (ZESTRIL) 10 mg tablet 11/10/2023  Yes Yes   Sig: Take 10 mg by mouth daily   methotrexate 2.5 mg tablet Not Taking Self Yes No   Sig: Take 10 mg by mouth once a week 10 mg on Friday   Patient not taking: Reported on 11/10/2023      Facility-Administered Medications: None       Past Medical History:   Diagnosis Date    Arthritis     Gastric ulcer 03/2021    hx of gastric ulcer, here for recheck EGD    Hypertension 07/06/2021    BP med discontinued by cardiologist, pt monitors BP on daily basis, borderline hypertension    Kidney stone     Kidney stones     RA (rheumatoid arthritis) ([address])     Snores     Umbilical hernia     Wears glasses        Past Surgical History:   Procedure Laterality Date    COLONOSCOPY      CYSTOSCOPY      FL RETROGRADE PYELOGRAM  7/9/2020    FL RETROGRADE PYELOGRAM  7/23/2020    [place] CYSTO W/URETEROSCOPY W/LITHOTRIPSY Right 7/23/2020    Procedure: CYSTOSCOPY, URETEROSCOPY, LASER, AND STENT;  Surgeon: Susana Burns MD;  Location: [facility];  Service: Urology    [place] CYSTO/URETERO W/LITHOTRIPSY &INDWELL STENT INSRT Right 7/9/2020    Procedure: CYSTOSCOPY WITH RIGHT STENT PLACEMENT;  Surgeon: Brody Terry Raj Mattson MD;  Location: [facility];  Service: Urology    UPPER GASTROINTESTINAL ENDOSCOPY      WISDOM TOOTH EXTRACTION         Family History   Problem Relation Age of Onset    Alzheimer's disease Mother     Splenomegaly Father     Thrombocytopenia Father     Thrombocytopenia Brother      I have reviewed and agree with the history as documented. E-Cigarette/Vaping    E-Cigarette Use Never User      E-Cigarette/Vaping Substances    Nicotine No     THC No     CBD No     Flavoring No     Other No     Unknown No      Social History     Tobacco Use    Smoking status: Never    Smokeless tobacco: Never   Vaping Use    Vaping Use: Never used   Substance Use Topics    Alcohol use: Yes     Comment: social 1-2 drinks a week at most, not consistently    Drug use: Never       Review of Systems   Constitutional:  Negative for chills and fever. HENT:  Negative for ear pain and sore throat. Eyes:  Negative for pain and visual disturbance. Respiratory:  Negative for cough and shortness of breath. Cardiovascular:  Negative for chest pain and palpitations. Gastrointestinal:  Negative for abdominal pain and vomiting. Endocrine: Negative. Genitourinary: Negative. Negative for dysuria and hematuria. Musculoskeletal:  Negative for arthralgias and back pain. Skin:  Positive for wound. Negative for color change and rash. Allergic/Immunologic: Negative. Neurological: Negative. Negative for seizures and syncope. Hematological: Negative. Psychiatric/Behavioral: Negative. All other systems reviewed and are negative. Physical Exam  Physical Exam  Vitals and nursing note reviewed. Constitutional:       Appearance: Normal appearance. HENT:      Head: Normocephalic and atraumatic. Right Ear: External ear normal.      Left Ear: External ear normal.      Nose: Nose normal.      Mouth/Throat:      Mouth: Mucous membranes are moist.   Eyes:      General: No scleral icterus.      Extraocular Movements: Extraocular movements intact. Pupils: Pupils are equal, round, and reactive to light. Cardiovascular:      Rate and Rhythm: Normal rate. Rhythm irregular. Pulmonary:      Effort: Pulmonary effort is normal. No respiratory distress. Breath sounds: Normal breath sounds. Abdominal:      General: Abdomen is flat. There is no distension. Palpations: Abdomen is soft. Musculoskeletal:         General: Normal range of motion. Cervical back: Normal range of motion and neck supple. Right lower leg: No edema. Left lower leg: No edema. Skin:     General: Skin is warm and dry. Neurological:      General: No focal deficit present. Mental Status: He is alert and oriented to person, place, and time. Psychiatric:         Mood and Affect: Mood normal.         Behavior: Behavior normal.         Vital Signs  ED Triage Vitals [11/10/23 0902]   Temperature Pulse Respirations Blood Pressure SpO2   (!) 96.9 °F (36.1 °C) 70 18 131/80 98 %      Temp Source Heart Rate Source Patient Position - Orthostatic VS BP Location FiO2 (%)   Tympanic -- -- -- --      Pain Score       1           Vitals:    11/10/23 0902   BP: 131/80   Pulse: 70         Visual Acuity      ED Medications  Medications   rabies immune globulin, human (HyperRAB) injection 2,040 Units (2,040 Units Infiltration Given 11/10/23 1112)   rabies vaccine, human diploid IM injection 1 mL (1 mL Intramuscular Given 11/10/23 1110)   amoxicillin-clavulanate (AUGMENTIN) 875-125 mg per tablet 1 tablet (1 tablet Oral Given 11/10/23 1109)       Diagnostic Studies  Results Reviewed       None                   No orders to display              Procedures  Procedures         ED Course                               SBIRT 20yo+      Flowsheet Row Most Recent Value   Initial Alcohol Screen: US AUDIT-C     1. How often do you have a drink containing alcohol? 0 Filed at: 11/10/2023 0904   2.  How many drinks containing alcohol do you have on a typical day you are drinking? 0 Filed at: 11/10/2023 0904   3a. Male UNDER 65: How often do you have five or more drinks on one occasion? 0 Filed at: 11/10/2023 0904   3b. FEMALE Any Age, or MALE 65+: How often do you have 4 or more drinks on one occassion? 0 Filed at: 11/10/2023 0904   Audit-C Score 0 Filed at: 11/10/2023 5444   VENESSA: How many times in the past year have you. .. Used an illegal drug or used a prescription medication for non-medical reasons? Never Filed at: 11/10/2023 2275                      Medical Decision Making  Problems Addressed:  Cat bite, initial encounter: acute illness or injury     Details: Tetanus updated  Patient discharged on Augmentin  First in series of rabies vaccines given  Rabies IVIG administered  Patient educated on red flag symptoms of necessitate return to the ED    Risk  Prescription drug management. Disposition  Final diagnoses:   Cat bite, initial encounter     Time reflects when diagnosis was documented in both MDM as applicable and the Disposition within this note       Time User Action Codes Description Comment    11/10/2023 10:47 AM Yanni Kenny Add [W55.01XA] Cat bite, initial encounter           ED Disposition       ED Disposition   Discharge    Condition   Stable    Date/Time   Fri Nov 10, 2023 [address] discharge to home/self care. Follow-up Information    None         Discharge Medication List as of 11/10/2023 11:13 AM        CONTINUE these medications which have NOT CHANGED    Details   folic acid (FOLVITE) 1 mg tablet Take 1 mg by mouth every morning, Historical Med      lisinopril (ZESTRIL) 10 mg tablet Take 10 mg by mouth daily, Historical Med      methotrexate 2.5 mg tablet Take 10 mg by mouth once a week 10 mg on Friday, Historical Med             No discharge procedures on file.     PDMP Review         Value Time User    PDMP Reviewed  Yes 7/9/2020  5:23 PM Ewa Pinto MD ED Provider  Electronically Signed by             Elder Lujan PA-C  11/10/23 2012

## 2023-11-13 ENCOUNTER — HOSPITAL ENCOUNTER (EMERGENCY)
Facility: HOSPITAL | Age: 58
Discharge: HOME | End: 2023-11-13
Attending: EMERGENCY MEDICINE
Payer: COMMERCIAL

## 2023-11-13 VITALS
RESPIRATION RATE: 18 BRPM | DIASTOLIC BLOOD PRESSURE: 57 MMHG | SYSTOLIC BLOOD PRESSURE: 128 MMHG | HEART RATE: 72 BPM | TEMPERATURE: 97.8 F | OXYGEN SATURATION: 96 %

## 2023-11-13 DIAGNOSIS — Z20.3 RABIES EXPOSURE: ICD-10-CM

## 2023-11-13 DIAGNOSIS — Z23 ENCOUNTER FOR ADMINISTRATION OF VACCINE: Primary | ICD-10-CM

## 2023-11-13 PROCEDURE — 90471 IMMUNIZATION ADMIN: CPT

## 2023-11-13 PROCEDURE — 90675 RABIES VACCINE IM: CPT | Performed by: EMERGENCY MEDICINE

## 2023-11-13 PROCEDURE — 99284 EMERGENCY DEPT VISIT MOD MDM: CPT | Performed by: EMERGENCY MEDICINE

## 2023-11-13 RX ADMIN — RABIES VIRUS STRAIN PM-1503-3M ANTIGEN (PROPIOLACTONE INACTIVATED) AND WATER 1 ML: KIT at 06:35

## 2023-11-13 NOTE — ED PROVIDER NOTES
History  Chief Complaint   Patient presents with    Follow Up Rabies     Pt here for follow-up rabies booster     50-year-old male past medical significant for recent visit to our department after cat bite. He is for for follow-up rabies booster. No side effects from prior boosters. Cat bite healing well and he has been doing local wound care at home. Prior to Admission Medications   Prescriptions Last Dose Informant Patient Reported?  Taking?   folic acid (FOLVITE) 1 mg tablet  Self Yes No   Sig: Take 1 mg by mouth every morning   lisinopril (ZESTRIL) 10 mg tablet   Yes No   Sig: Take 10 mg by mouth daily   methotrexate 2.5 mg tablet  Self Yes No   Sig: Take 10 mg by mouth once a week 10 mg on Friday   Patient not taking: Reported on 11/10/2023      Facility-Administered Medications: None       Past Medical History:   Diagnosis Date    Arthritis     Gastric ulcer 03/2021    hx of gastric ulcer, here for recheck EGD    Hypertension 07/06/2021    BP med discontinued by cardiologist, pt monitors BP on daily basis, borderline hypertension    Kidney stone     Kidney stones     RA (rheumatoid arthritis) ([address])     Snores     Umbilical hernia     Wears glasses        Past Surgical History:   Procedure Laterality Date    COLONOSCOPY      CYSTOSCOPY      FL RETROGRADE PYELOGRAM  7/9/2020    FL RETROGRADE PYELOGRAM  7/23/2020    [place] CYSTO W/URETEROSCOPY W/LITHOTRIPSY Right 7/23/2020    Procedure: CYSTOSCOPY, URETEROSCOPY, LASER, AND STENT;  Surgeon: Roma Roblero MD;  Location: [facility];  Service: Urology    [place] CYSTO/URETERO W/LITHOTRIPSY &INDWELL STENT INSRT Right 7/9/2020    Procedure: CYSTOSCOPY WITH RIGHT STENT PLACEMENT;  Surgeon: Roma Roblero MD;  Location: [facility];  Service: Urology    UPPER GASTROINTESTINAL ENDOSCOPY      WISDOM TOOTH EXTRACTION         Family History   Problem Relation Age of Onset    Alzheimer's disease Mother     Splenomegaly Father     Thrombocytopenia Father     Thrombocytopenia Brother      I have reviewed and agree with the history as documented. E-Cigarette/Vaping    E-Cigarette Use Never User      E-Cigarette/Vaping Substances    Nicotine No     THC No     CBD No     Flavoring No     Other No     Unknown No      Social History     Tobacco Use    Smoking status: Never    Smokeless tobacco: Never   Vaping Use    Vaping Use: Never used   Substance Use Topics    Alcohol use: Yes     Comment: social 1-2 drinks a week at most, not consistently    Drug use: Never       Review of Systems   Constitutional:  Negative for chills and fever. HENT:  Negative for hearing loss. Eyes:  Negative for visual disturbance. Respiratory:  Negative for shortness of breath. Cardiovascular:  Negative for chest pain. Gastrointestinal:  Negative for abdominal pain, constipation, diarrhea, nausea and vomiting. Genitourinary:  Negative for difficulty urinating. Musculoskeletal:  Negative for myalgias. Skin:  Negative for rash. Neurological:  Negative for dizziness. Psychiatric/Behavioral:  Negative for agitation. All other systems reviewed and are negative. Physical Exam  Physical Exam  Vitals and nursing note reviewed. Constitutional:       General: He is not in acute distress. Appearance: Normal appearance. He is not ill-appearing. HENT:      Head: Normocephalic and atraumatic. Right Ear: External ear normal.      Left Ear: External ear normal.      Nose: Nose normal.      Mouth/Throat:      Mouth: Mucous membranes are moist.      Pharynx: Oropharynx is clear. No oropharyngeal exudate. Eyes:      Extraocular Movements: Extraocular movements intact. Conjunctiva/sclera: Conjunctivae normal.      Pupils: Pupils are equal, round, and reactive to light. Cardiovascular:      Rate and Rhythm: Normal rate and regular rhythm. Pulses: Normal pulses. Heart sounds: Normal heart sounds.    Pulmonary:      Effort: Pulmonary effort is normal.      Breath sounds: Normal breath sounds. Abdominal:      General: Abdomen is flat. Bowel sounds are normal. There is no distension. Palpations: Abdomen is soft. Tenderness: There is no abdominal tenderness. Musculoskeletal:         General: No swelling. Normal range of motion. Cervical back: Normal range of motion. Skin:     General: Skin is warm and dry. Capillary Refill: Capillary refill takes less than 2 seconds. Comments: Small abrasions to the left wrist at the site of the prior cat bite. Neurological:      General: No focal deficit present. Mental Status: He is alert and oriented to person, place, and time. Mental status is at baseline. Psychiatric:         Mood and Affect: Mood normal.         Behavior: Behavior normal.         Vital Signs  ED Triage Vitals [11/13/23 0630]   Temperature Pulse Respirations Blood Pressure SpO2   97.8 °F (36.6 °C) 72 18 128/57 96 %      Temp Source Heart Rate Source Patient Position - Orthostatic VS BP Location FiO2 (%)   Oral -- Sitting Left arm --      Pain Score       --           Vitals:    11/13/23 0630   BP: 128/57   Pulse: 72   Patient Position - Orthostatic VS: Sitting         Visual Acuity      ED Medications  Medications   rabies vaccine, human diploid IM injection 1 mL (1 mL Intramuscular Given 11/13/23 8091)       Diagnostic Studies  Results Reviewed       None                   No orders to display              Procedures  Procedures         ED Course                               SBIRT 22yo+      Flowsheet Row Most Recent Value   Initial Alcohol Screen: US AUDIT-C     1. How often do you have a drink containing alcohol? 0 Filed at: 11/13/2023 0634   2. How many drinks containing alcohol do you have on a typical day you are drinking? 0 Filed at: 11/13/2023 0634   3a. Male UNDER 65: How often do you have five or more drinks on one occasion? 0 Filed at: 11/13/2023 0634   3b. FEMALE Any Age, or MALE 65+:  How often do you have 4 or more drinks on one occassion? 0 Filed at: 11/13/2023 0634   Audit-C Score 0 Filed at: 11/13/2023 6849   VENESSA: How many times in the past year have you. .. Used an illegal drug or used a prescription medication for non-medical reasons? Never Filed at: 11/13/2023 5801                      Medical Decision Making  39-year-old male presenting to ED today for rabies booster. Rabies vaccine administered here. Patient was instructed to return on day 7 and 14. Strict return to ER precautions discussed and patient was discharged home. Risk  Prescription drug management. Disposition  Final diagnoses:   Encounter for administration of vaccine   Rabies exposure     Time reflects when diagnosis was documented in both MDM as applicable and the Disposition within this note       Time User Action Codes Description Comment    11/13/2023  6:34 AM Brit Reina Add [Z23] Encounter for administration of vaccine     11/13/2023  6:34 AM Brit Reina Add [Z20.3] Rabies exposure           ED Disposition       ED Disposition   Discharge    Condition   Stable    Date/Time   Mon Nov 13, 2023 3 [facility] discharge to home/self care. Follow-up Information       Follow up With Specialties Details Why Contact Info Additional Information    [address] Emergency Department Emergency Medicine Go to  If symptoms worsen, As needed [address] Emergency Department, [address] [place] [address], [facility]            Patient's Medications   Discharge Prescriptions    No medications on file       No discharge procedures on file.     PDMP Review         Value Time User    PDMP Reviewed  Yes 7/9/2020  5:23 PM J Luis Donnelly MD            ED Provider  Electronically Signed by             Brit Reina MD  11/13/23 0260

## 2023-11-17 ENCOUNTER — HOSPITAL ENCOUNTER (EMERGENCY)
Facility: HOSPITAL | Age: 58
Discharge: HOME | End: 2023-11-17
Attending: EMERGENCY MEDICINE
Payer: COMMERCIAL

## 2023-11-17 VITALS
HEART RATE: 73 BPM | RESPIRATION RATE: 16 BRPM | SYSTOLIC BLOOD PRESSURE: 134 MMHG | OXYGEN SATURATION: 99 % | DIASTOLIC BLOOD PRESSURE: 79 MMHG | TEMPERATURE: 97.9 F

## 2023-11-17 DIAGNOSIS — Z23 NEED FOR IMMUNIZATION AGAINST RABIES: Primary | ICD-10-CM

## 2023-11-17 PROCEDURE — 90471 IMMUNIZATION ADMIN: CPT

## 2023-11-17 PROCEDURE — 99282 EMERGENCY DEPT VISIT SF MDM: CPT | Performed by: EMERGENCY MEDICINE

## 2023-11-17 PROCEDURE — 90675 RABIES VACCINE IM: CPT | Performed by: EMERGENCY MEDICINE

## 2023-11-17 RX ADMIN — RABIES VIRUS STRAIN PM-1503-3M ANTIGEN (PROPIOLACTONE INACTIVATED) AND WATER 1 ML: KIT at 06:33

## 2023-11-17 NOTE — ED PROVIDER NOTES
Final Diagnosis:  1. Need for immunization against rabies        Chief Complaint   Patient presents with    Immunizations     Pt states that last Friday he was bit by a feral cat, presents today for another rabbies vaccine. HPI  Patient was bit by a cat on the hand. Had rabies vaccine initiated 7 days ago had a 3-day shot as well here for the third in the series. Still do a 2-week shot. Has had no complications or issues. Wound is healing well. Finished a course of Augmentin. Range of motion in digit/extremity without any redness swelling warmth    EMS reports if applicable: N/A     - Previous charting underwent limited review with attention to last ED visits, labs, ekgs, and prior imaging. Chart review reveals :     Appointment on 11/03/2022   Component Date Value Ref Range Status    PSA 11/03/2022 0.6  0.0 - 4.0 ng/mL Final    American Urological Association Guidelines define biochemical recurrence of prostate cancer as a detectable or rising PSA value post-radical prostatectomy that is greater than or equal to 0.2 ng/mL with a second confirmatory level of greater than or equal to 0.2 ng/mL. - No language barrier.   - History obtained from patient . - There are n limitations to the history obtained. - Discuss patient's care, with patient permission or by chart review, with      PMH:   has a past medical history of Arthritis, Gastric ulcer (03/2021), Hypertension (07/06/2021), Kidney stone, Kidney stones, RA (rheumatoid arthritis) ([address]), Snores, Umbilical hernia, and Wears glasses. PSH:   has a past surgical history that includes FL retrograde pyelogram (7/9/2020); pr cysto/uretero w/lithotripsy &indwell stent insrt (Right, 7/9/2020); Cystoscopy; [place] retrograde pyelogram (7/23/2020); pr cysto w/ureteroscopy w/lithotripsy (Right, 7/23/2020); Upper gastrointestinal endoscopy; [place] tooth extraction; and Colonoscopy.      Social History:  Tobacco Use: Low Risk  (11/17/2023)    Patient History Smoking Tobacco Use: Never     Smokeless Tobacco Use: Never     Passive Exposure: Not on file     Alcohol Use: Not At Risk (5/11/2021)    AUDIT-C     Frequency of Alcohol Consumption: 2-4 times a month     Average Number of Drinks: 1 or 2     Frequency of Binge Drinking: Never     No illicit use       ROS:  Pertinent positives/negatives: Orville Speaker Some ROS may be present in the HPI and would take precedent over these standard questions asked below. Review of Systems     CONSTITUTIONAL:  No lethargy. No weakness. No unexpected weight loss. No appetite change. EYES:  No pain, redness, or discharge. No loss of vision. No orbital trauma or pain. ENT:  No tinnitus or decreased hearing. No epistaxis/purulent rhinorrhea. No voice change, airway closing, trismus. CARDIOVASCULAR:  No chest pain. No skin mottling or pallor. No change in exertional capacity  RESPIRATORY:  No hemoptysis. No paroxysmal nocturnal dyspnea. No stridor. No audible wheezing. No production with cough. GASTROINTESTINAL:  Normal appetite. No vomiting, diarrhea. No pain. No bloating. No melena. No hematochezia. GENITOURINARY:  No frequency, urgency, nocturia. No hematuria or dysuria. No discharge. No sores/adenopathy. MUSCULOSKELETAL:  No arthralgias or myalgias that are new. No new deformity. INTEGUMENTARY:  No swelling. No unexpected contusions. No abrasions. No lymphangitis. NEUROLOGIC:  No meningismus. No new numbness of the extremities. No new focal weakness. No postural instability  PSYCHIATRIC:  No SI HI AVH  HEMATOLOGICAL:  No bleeding. No petechiae. No bruising. ALLERGIES:  No urticaria. No sudden abd cramping. No stridor. PE:     Physical exam highlights:   Physical Exam       Vitals:    11/17/23 0632   BP: 134/79   BP Location: Left arm   Pulse: 73   Resp: 16   Temp: 97.9 °F (36.6 °C)   TempSrc: Oral   SpO2: 99%     Vitals reviewed by me.    Nursing note reviewed  Chaperone present for all sensitive exam.  Const: No acute distress. Alert. Nontoxic. Not diaphoretic. HEENT: External ears normal. No protrusion drainage swelling. Nose normal. No drainage/traumatic deformity. MMM. Mouth with baseline/symmetric movement. No trismus. Eyes: No squinting. No icterus. No tearing/swelling/drainage. Tracks through the room with normal EOM. Neck: ROM normal. No rigidity. No meningismus. Cards: Rate as per vitals Compared to monitor sinus unless documented. Regular Well perfused. Pulm: able to verbalize without additional effort. Effort and excursion normal. No distress. No audible wheezing/ stridor. Normal resp rate without retraction or change in work of breathing. Abd: No distension beyond baseline. No fluctuant wave. Patient without peritoneal pain with shifting/bumping the bed. MSK: ROM normal baseline. No deformity. No contractures from baseline. Skin: No new rashes visible. Well perfused. Wound healed   Neuro: Nonfocal. Baseline. CN grossly intact. Moving all four with coordination. Psych: Normal behavior and affect. A:  - Nursing note reviewed. Ddx and MDM  Considered diagnoses    Here for rabies series   Wound ok  Augmentin complete       My conversation with consultant reveals: NA       Decision rules:                           My read of the XR/CT scan reveals:  NA   No orders to display       No orders of the defined types were placed in this encounter. Labs Reviewed - No data to display    *Each of these labs was reviewed. Particular standout labs will be noted in the ED Course above     Final Diagnosis:  1.  Need for immunization against rabies          P:  - hospital tx includes   Medications   rabies vaccine, human diploid IM injection 1 mL (1 mL Intramuscular Given 11/17/23 7987)         - disposition  Time reflects when diagnosis was documented in both MDM as applicable and the Disposition within this note       Time User Action Codes Description Comment    11/17/2023  6:41 AM Eddie Jorgensen Add [Z23] Need for immunization against rabies           ED Disposition       ED Disposition   Discharge    Condition   Stable    Date/Time   Fri Nov 17, 2023  6:41 AM    Comment   Barbis Lizzy discharge to home/self care. Follow-up Information    None         - patient will call their PCP to let them know they were in the emergency department. We discuss return precautions and patient is agreeable with plan and aformentioned disposition. - additional treatment intended, if consistent with primary provider:  - patient to follow with :      Discharge Medication List as of 11/17/2023  6:41 AM        CONTINUE these medications which have NOT CHANGED    Details   folic acid (FOLVITE) 1 mg tablet Take 1 mg by mouth every morning, Historical Med      lisinopril (ZESTRIL) 10 mg tablet Take 10 mg by mouth daily, Historical Med      methotrexate 2.5 mg tablet Take 10 mg by mouth once a week 10 mg on Friday, Historical Med           No discharge procedures on file. Prior to Admission Medications   Prescriptions Last Dose Informant Patient Reported? Taking?   folic acid (FOLVITE) 1 mg tablet  Self Yes No   Sig: Take 1 mg by mouth every morning   lisinopril (ZESTRIL) 10 mg tablet   Yes No   Sig: Take 10 mg by mouth daily   methotrexate 2.5 mg tablet  Self Yes No   Sig: Take 10 mg by mouth once a week 10 mg on Friday   Patient not taking: Reported on 11/10/2023      Facility-Administered Medications: None       Portions of the record may have been created with voice recognition software. Occasional wrong word or "sound a like" substitutions may have occurred due to the inherent limitations of voice recognition software. Read the chart carefully and recognize, using context, where substitutions have occurred.     Electronically signed by:  MD Teodoro Mir MD  11/17/23 7737

## 2023-11-24 ENCOUNTER — HOSPITAL ENCOUNTER (EMERGENCY)
Facility: HOSPITAL | Age: 58
Discharge: HOME | End: 2023-11-24
Attending: EMERGENCY MEDICINE
Payer: COMMERCIAL

## 2023-11-24 VITALS
OXYGEN SATURATION: 98 % | DIASTOLIC BLOOD PRESSURE: 80 MMHG | TEMPERATURE: 97.1 F | RESPIRATION RATE: 18 BRPM | SYSTOLIC BLOOD PRESSURE: 119 MMHG | HEART RATE: 76 BPM

## 2023-11-24 DIAGNOSIS — Z23 ENCOUNTER FOR REPEAT ADMINISTRATION OF RABIES VACCINATION: Primary | ICD-10-CM

## 2023-11-24 PROCEDURE — 99284 EMERGENCY DEPT VISIT MOD MDM: CPT | Performed by: EMERGENCY MEDICINE

## 2023-11-24 PROCEDURE — 90675 RABIES VACCINE IM: CPT | Performed by: EMERGENCY MEDICINE

## 2023-11-24 PROCEDURE — 90471 IMMUNIZATION ADMIN: CPT

## 2023-11-24 RX ADMIN — Medication 1 ML: at 07:32

## 2023-11-24 NOTE — ED PROVIDER NOTES
History  Chief Complaint   Patient presents with    Follow Up Rabies     Pt. Here for his final rabies shot     Patient is here for his last vaccination post rabies exposure. Offers no complaints states that he tolerated the vaccine well. Prior to Admission Medications   Prescriptions Last Dose Informant Patient Reported? Taking?   folic acid (FOLVITE) 1 mg tablet  Self Yes No   Sig: Take 1 mg by mouth every morning   lisinopril (ZESTRIL) 10 mg tablet   Yes No   Sig: Take 10 mg by mouth daily   methotrexate 2.5 mg tablet  Self Yes No   Sig: Take 10 mg by mouth once a week 10 mg on Friday   Patient not taking: Reported on 11/10/2023      Facility-Administered Medications: None       Past Medical History:   Diagnosis Date    Arthritis     Gastric ulcer 03/2021    hx of gastric ulcer, here for recheck EGD    Hypertension 07/06/2021    BP med discontinued by cardiologist, pt monitors BP on daily basis, borderline hypertension    Kidney stone     Kidney stones     RA (rheumatoid arthritis) ([address])     Snores     Umbilical hernia     Wears glasses        Past Surgical History:   Procedure Laterality Date    COLONOSCOPY      CYSTOSCOPY      FL RETROGRADE PYELOGRAM  7/9/2020    FL RETROGRADE PYELOGRAM  7/23/2020    FL CYSTO W/URETEROSCOPY W/LITHOTRIPSY Right 7/23/2020    Procedure: CYSTOSCOPY, URETEROSCOPY, LASER, AND STENT;  Surgeon: Romelia Burton MD;  Location: [facility];  Service: Urology    FL CYSTO/URETERO W/LITHOTRIPSY &INDWELL STENT INSRT Right 7/9/2020    Procedure: CYSTOSCOPY WITH RIGHT STENT PLACEMENT;  Surgeon: Romelia Burton MD;  Location: [facility];  Service: Urology    UPPER GASTROINTESTINAL ENDOSCOPY      WISDOM TOOTH EXTRACTION         Family History   Problem Relation Age of Onset    Alzheimer's disease Mother     Splenomegaly Father     Thrombocytopenia Father     Thrombocytopenia Brother      I have reviewed and agree with the history as documented.     E-Cigarette/Vaping    E-Cigarette Use Never User      E-Cigarette/Vaping Substances    Nicotine No     THC No     CBD No     Flavoring No     Other No     Unknown No      Social History     Tobacco Use    Smoking status: Never    Smokeless tobacco: Never   Vaping Use    Vaping Use: Never used   Substance Use Topics    Alcohol use: Yes     Comment: social 1-2 drinks a week at most, not consistently    Drug use: Never       Review of Systems   Constitutional:  Negative for chills and fever. HENT:  Negative for congestion. Eyes:  Negative for visual disturbance. Respiratory:  Negative for shortness of breath. Cardiovascular:  Negative for chest pain. Gastrointestinal:  Negative for abdominal pain and vomiting. Genitourinary:  Negative for dysuria. Musculoskeletal:  Negative for arthralgias. Skin:  Positive for wound. Healing wound on wrist   Neurological:  Negative for headaches. Hematological:  Does not bruise/bleed easily. Psychiatric/Behavioral:  Negative for confusion. All other systems reviewed and are negative. Physical Exam  Physical Exam  Vitals and nursing note reviewed. Constitutional:       Appearance: Normal appearance. HENT:      Head: Normocephalic. Right Ear: External ear normal.      Left Ear: External ear normal.      Nose: Nose normal.      Mouth/Throat:      Mouth: Mucous membranes are moist.   Cardiovascular:      Rate and Rhythm: Normal rate. Pulses: Normal pulses. Pulmonary:      Effort: Pulmonary effort is normal.   Abdominal:      Palpations: Abdomen is soft. Musculoskeletal:         General: Normal range of motion. Cervical back: Normal range of motion. Skin:     General: Skin is warm and dry. Capillary Refill: Capillary refill takes less than 2 seconds. Findings: No erythema. Neurological:      General: No focal deficit present. Mental Status: He is alert.    Psychiatric:         Mood and Affect: Mood normal.         Vital Signs  ED Triage Vitals [11/24/23 0725]   Temperature Pulse Respirations Blood Pressure SpO2   (!) 97.1 °F (36.2 °C) 76 18 119/80 98 %      Temp src Heart Rate Source Patient Position - Orthostatic VS BP Location FiO2 (%)   -- -- -- -- --      Pain Score       No Pain           Vitals:    11/24/23 0725   BP: 119/80   Pulse: 76         Visual Acuity      ED Medications  Medications   rabies vaccine, human diploid IM injection 1 mL (has no administration in time range)       Diagnostic Studies  Results Reviewed       None                   No orders to display              Procedures  Procedures         ED Course                               SBIRT 20yo+      Flowsheet Row Most Recent Value   Initial Alcohol Screen: US AUDIT-C     1. How often do you have a drink containing alcohol? 0 Filed at: 11/24/2023 0727   2. How many drinks containing alcohol do you have on a typical day you are drinking? 0 Filed at: 11/24/2023 0727   3a. Male UNDER 65: How often do you have five or more drinks on one occasion? 0 Filed at: 11/24/2023 0727   3b. FEMALE Any Age, or MALE 65+: How often do you have 4 or more drinks on one occassion? 0 Filed at: 11/24/2023 0727   Audit-C Score 0 Filed at: 11/24/2023 8726   VENESSA: How many times in the past year have you. .. Used an illegal drug or used a prescription medication for non-medical reasons?  Never Filed at: 11/24/2023 9996                      Medical Decision Making  Rabies vaccination ordered             Disposition  Final diagnoses:   Encounter for repeat administration of rabies vaccination     Time reflects when diagnosis was documented in both MDM as applicable and the Disposition within this note       Time User Action Codes Description Comment    11/24/2023  7:29 AM Rajendra Erazo Add [Z23] Encounter for repeat administration of rabies vaccination           ED Disposition       ED Disposition   Discharge    Condition   Stable    Date/Time   Fri Nov 24, 2023 [address] discharge to home/self care. Follow-up Information       Follow up With Specialties Details Why [address]    [phone]              Patient's Medications   Discharge Prescriptions    No medications on file       No discharge procedures on file.     PDMP Review         Value Time User    PDMP Reviewed  Yes 7/9/2020  5:23 PM Lenora Tadeo MD            ED Provider  Electronically Signed by             Emperatriz Villavicencio MD  11/24/23 1874

## 2023-12-02 ENCOUNTER — APPOINTMENT (OUTPATIENT)
Dept: LAB | Facility: HOSPITAL | Age: 58
End: 2023-12-02
Payer: COMMERCIAL

## 2023-12-02 ENCOUNTER — HOSPITAL ENCOUNTER (OUTPATIENT)
Dept: RADIOLOGY | Facility: HOSPITAL | Age: 58
Discharge: HOME | End: 2023-12-02
Payer: COMMERCIAL

## 2023-12-02 DIAGNOSIS — N20.0 RENAL CALCULUS: ICD-10-CM

## 2023-12-02 DIAGNOSIS — R35.1 NOCTURIA: ICD-10-CM

## 2023-12-02 LAB — PSA SERPL-MCNC: 0.63 NG/ML (ref 0–4)

## 2023-12-02 PROCEDURE — 36415 COLL VENOUS BLD VENIPUNCTURE: CPT

## 2023-12-02 PROCEDURE — 74018 RADEX ABDOMEN 1 VIEW: CPT

## 2023-12-02 PROCEDURE — 84153 ASSAY OF PSA TOTAL: CPT

## 2024-01-12 ENCOUNTER — APPOINTMENT (EMERGENCY)
Dept: RADIOLOGY | Facility: HOSPITAL | Age: 59
End: 2024-01-12
Payer: COMMERCIAL

## 2024-01-12 ENCOUNTER — HOSPITAL ENCOUNTER (EMERGENCY)
Facility: HOSPITAL | Age: 59
Discharge: HOME | End: 2024-01-12
Attending: EMERGENCY MEDICINE
Payer: COMMERCIAL

## 2024-01-12 VITALS
SYSTOLIC BLOOD PRESSURE: 133 MMHG | HEART RATE: 84 BPM | DIASTOLIC BLOOD PRESSURE: 74 MMHG | TEMPERATURE: 97.1 F | RESPIRATION RATE: 20 BRPM | OXYGEN SATURATION: 96 %

## 2024-01-12 DIAGNOSIS — K62.89 PROCTITIS: Primary | ICD-10-CM

## 2024-01-12 DIAGNOSIS — R33.9 URINARY RETENTION: ICD-10-CM

## 2024-01-12 LAB
ALBUMIN SERPL BCP-MCNC: 4.6 G/DL (ref 3.5–5)
ALP SERPL-CCNC: 59 U/L (ref 34–104)
ALT SERPL W P-5'-P-CCNC: 45 U/L (ref 7–52)
ANION GAP SERPL CALCULATED.3IONS-SCNC: 7 MMOL/L
ANISOCYTOSIS BLD QL SMEAR: PRESENT
AST SERPL W P-5'-P-CCNC: 28 U/L (ref 13–39)
BACTERIA UR QL AUTO: NORMAL /HPF
BASOPHILS # BLD MANUAL: 0.34 THOUSAND/UL (ref 0–0.1)
BASOPHILS NFR MAR MANUAL: 2 % (ref 0–1)
BILIRUB SERPL-MCNC: 0.65 MG/DL (ref 0.2–1)
BILIRUB UR QL STRIP: NEGATIVE
BUN SERPL-MCNC: 28 MG/DL (ref 5–25)
CALCIUM SERPL-MCNC: 9.4 MG/DL (ref 8.4–10.2)
CHLORIDE SERPL-SCNC: 104 MMOL/L (ref 96–108)
CLARITY UR: CLEAR
CO2 SERPL-SCNC: 26 MMOL/L (ref 21–32)
COLOR UR: YELLOW
CREAT SERPL-MCNC: 1.21 MG/DL (ref 0.6–1.3)
EOSINOPHIL # BLD MANUAL: 0 THOUSAND/UL (ref 0–0.4)
EOSINOPHIL NFR BLD MANUAL: 0 % (ref 0–6)
ERYTHROCYTE [DISTWIDTH] IN BLOOD BY AUTOMATED COUNT: 14 % (ref 11.6–15.1)
GFR SERPL CREATININE-BSD FRML MDRD: 65 ML/MIN/1.73SQ M
GLUCOSE SERPL-MCNC: 151 MG/DL (ref 65–140)
GLUCOSE UR STRIP-MCNC: NEGATIVE MG/DL
HCT VFR BLD AUTO: 50.4 % (ref 36.5–49.3)
HGB BLD-MCNC: 16.7 G/DL (ref 12–17)
HGB UR QL STRIP.AUTO: NEGATIVE
KETONES UR STRIP-MCNC: NEGATIVE MG/DL
LEUKOCYTE ESTERASE UR QL STRIP: NEGATIVE
LYMPHOCYTES # BLD AUTO: 0.34 THOUSAND/UL (ref 0.6–4.47)
LYMPHOCYTES # BLD AUTO: 2 % (ref 14–44)
MCH RBC QN AUTO: 27.9 PG (ref 26.8–34.3)
MCHC RBC AUTO-ENTMCNC: 33.1 G/DL (ref 31.4–37.4)
MCV RBC AUTO: 84 FL (ref 82–98)
MONOCYTES # BLD AUTO: 0.51 THOUSAND/UL (ref 0–1.22)
MONOCYTES NFR BLD: 3 % (ref 4–12)
NEUTROPHILS # BLD MANUAL: 15.81 THOUSAND/UL (ref 1.85–7.62)
NEUTS BAND NFR BLD MANUAL: 2 % (ref 0–8)
NEUTS SEG NFR BLD AUTO: 91 % (ref 43–75)
NITRITE UR QL STRIP: NEGATIVE
NON-SQ EPI CELLS URNS QL MICRO: NORMAL /HPF
OVALOCYTES BLD QL SMEAR: PRESENT
PH UR STRIP.AUTO: 5.5 [PH]
PLATELET # BLD AUTO: 126 THOUSANDS/UL (ref 149–390)
PLATELET BLD QL SMEAR: ABNORMAL
PMV BLD AUTO: 12.5 FL (ref 8.9–12.7)
POTASSIUM SERPL-SCNC: 4.4 MMOL/L (ref 3.5–5.3)
PROT SERPL-MCNC: 7.8 G/DL (ref 6.4–8.4)
PROT UR STRIP-MCNC: ABNORMAL MG/DL
RBC # BLD AUTO: 5.99 MILLION/UL (ref 3.88–5.62)
RBC #/AREA URNS AUTO: NORMAL /HPF
RBC MORPH BLD: PRESENT
SODIUM SERPL-SCNC: 137 MMOL/L (ref 135–147)
SP GR UR STRIP.AUTO: >=1.03 (ref 1–1.03)
UROBILINOGEN UR QL STRIP.AUTO: 0.2 E.U./DL
WBC # BLD AUTO: 17 THOUSAND/UL (ref 4.31–10.16)
WBC #/AREA URNS AUTO: NORMAL /HPF

## 2024-01-12 PROCEDURE — 36415 COLL VENOUS BLD VENIPUNCTURE: CPT | Performed by: EMERGENCY MEDICINE

## 2024-01-12 PROCEDURE — 85027 COMPLETE CBC AUTOMATED: CPT | Performed by: EMERGENCY MEDICINE

## 2024-01-12 PROCEDURE — G1004 CDSM NDSC: HCPCS

## 2024-01-12 PROCEDURE — 99284 EMERGENCY DEPT VISIT MOD MDM: CPT | Performed by: EMERGENCY MEDICINE

## 2024-01-12 PROCEDURE — 74177 CT ABD & PELVIS W/CONTRAST: CPT

## 2024-01-12 PROCEDURE — 99284 EMERGENCY DEPT VISIT MOD MDM: CPT

## 2024-01-12 PROCEDURE — 81001 URINALYSIS AUTO W/SCOPE: CPT | Performed by: EMERGENCY MEDICINE

## 2024-01-12 PROCEDURE — 85007 BL SMEAR W/DIFF WBC COUNT: CPT | Performed by: EMERGENCY MEDICINE

## 2024-01-12 PROCEDURE — 80053 COMPREHEN METABOLIC PANEL: CPT | Performed by: EMERGENCY MEDICINE

## 2024-01-12 RX ORDER — CIPROFLOXACIN 500 MG/1
500 TABLET, FILM COATED ORAL 2 TIMES DAILY
Qty: 14 TABLET | Refills: 0 | Status: SHIPPED | OUTPATIENT
Start: 2024-01-12 | End: 2024-01-19

## 2024-01-12 RX ORDER — CIPROFLOXACIN 500 MG/1
500 TABLET, FILM COATED ORAL ONCE
Status: COMPLETED | OUTPATIENT
Start: 2024-01-12 | End: 2024-01-12

## 2024-01-12 RX ADMIN — CIPROFLOXACIN HYDROCHLORIDE 500 MG: 500 TABLET, FILM COATED ORAL at 23:12

## 2024-01-12 RX ADMIN — IOHEXOL 100 ML: 350 INJECTION, SOLUTION INTRAVENOUS at 21:43

## 2024-01-13 NOTE — DISCHARGE INSTRUCTIONS
Return to the ER for further concerns or worsening symptoms  Follow up with your primary care physician, GI and urology in 1-2 days  Take medication as prescribed

## 2024-01-13 NOTE — ED NOTES
Pt seen, assessed and d/c by provider. Pt appeared to be in no acute distress upon discharge. Pt able to ambulate well without assistance upon exiting.      Annabelle Harry RN  01/12/24 1180

## 2024-01-13 NOTE — ED NOTES
Pt able to void prior to IV. Urine sample collected and sent. Pt denies any pressure from bladder at this time. Pt also reported having large bowel movement in bathroom.      Annabelle Harry RN  01/12/24 2042

## 2024-01-13 NOTE — ED PROVIDER NOTES
History  Chief Complaint   Patient presents with    Difficulty Urinating     Has not urinated since 11am, also having prob with constipation today, thinks he took too much metamucil, now runny bowels     Patient is a 58-year-old male with a history of hypertension, rheumatoid arthritis in the ED with complaint of acute urinary retention.  He states that he last voided at 11 AM today and has been unable to urinate since.  Patient states that he felt constipated upon waking up, took a dose of MiraLAX and has had frequent loose stools since.  He denies abdominal pain, nausea or vomiting, fevers or chills.      History provided by:  Patient   used: No    Difficulty Urinating  Presenting symptoms: no dysuria    Associated symptoms: diarrhea    Associated symptoms: no abdominal pain, no fever, no flank pain, no hematuria, no nausea and no vomiting        Prior to Admission Medications   Prescriptions Last Dose Informant Patient Reported? Taking?   folic acid (FOLVITE) 1 mg tablet  Self Yes No   Sig: Take 1 mg by mouth every morning   lisinopril (ZESTRIL) 10 mg tablet   Yes No   Sig: Take 10 mg by mouth daily   methotrexate 2.5 mg tablet  Self Yes No   Sig: Take 10 mg by mouth once a week 10 mg on Friday   Patient not taking: Reported on 11/10/2023      Facility-Administered Medications: None       Past Medical History:   Diagnosis Date    Arthritis     Gastric ulcer 03/2021    hx of gastric ulcer, here for recheck EGD    Hypertension 07/06/2021    BP med discontinued by cardiologist, pt monitors BP on daily basis, borderline hypertension    Kidney stone     Kidney stones     RA (rheumatoid arthritis) ([facility])     Snores     Umbilical hernia     Wears glasses        Past Surgical History:   Procedure Laterality Date    COLONOSCOPY      CYSTOSCOPY      FL RETROGRADE PYELOGRAM  7/9/2020    FL RETROGRADE PYELOGRAM  7/23/2020    FL CYSTO W/URETEROSCOPY W/LITHOTRIPSY Right 7/23/2020    Procedure: CYSTOSCOPY,  URETEROSCOPY, LASER, AND STENT;  Surgeon: David Feliciano MD;  Location: [facility];  Service: Urology    NM CYSTO/URETERO W/LITHOTRIPSY &INDWELL STENT INSRT Right 7/9/2020    Procedure: CYSTOSCOPY WITH RIGHT STENT PLACEMENT;  Surgeon: David Feliciano MD;  Location: [facility];  Service: Urology    UPPER GASTROINTESTINAL ENDOSCOPY      WISDOM TOOTH EXTRACTION         Family History   Problem Relation Age of Onset    Alzheimer's disease Mother     Splenomegaly Father     Thrombocytopenia Father     Thrombocytopenia Brother      I have reviewed and agree with the history as documented.    E-Cigarette/Vaping    E-Cigarette Use Never User      E-Cigarette/Vaping Substances    Nicotine No     THC No     CBD No     Flavoring No     Other No     Unknown No      Social History     Tobacco Use    Smoking status: Never    Smokeless tobacco: Never   Vaping Use    Vaping status: Never Used   Substance Use Topics    Alcohol use: Yes     Comment: social 1-2 drinks a week at most, not consistently    Drug use: Never       Review of Systems   Constitutional:  Negative for chills and fever.   Respiratory:  Negative for cough, shortness of breath and wheezing.    Cardiovascular:  Negative for chest pain and palpitations.   Gastrointestinal:  Positive for diarrhea. Negative for abdominal pain, constipation, nausea and vomiting.   Genitourinary:  Positive for difficulty urinating. Negative for dysuria, flank pain, hematuria and urgency.   Musculoskeletal:  Negative for back pain.   Skin:  Negative for color change and rash.   All other systems reviewed and are negative.      Physical Exam  Physical Exam  Vitals and nursing note reviewed.   Constitutional:       Appearance: He is well-developed.   HENT:      Head: Normocephalic and atraumatic.   Eyes:      Pupils: Pupils are equal, round, and reactive to light.   Cardiovascular:      Rate and Rhythm: Normal rate and regular rhythm.      Heart sounds: Normal heart sounds.   Pulmonary:       Effort: Pulmonary effort is normal.      Breath sounds: Normal breath sounds.   Abdominal:      General: Bowel sounds are normal. There is no distension.      Palpations: Abdomen is soft. There is no mass.      Tenderness: There is no abdominal tenderness. There is no guarding or rebound.   Musculoskeletal:      Cervical back: Normal range of motion and neck supple.   Skin:     General: Skin is warm and dry.      Capillary Refill: Capillary refill takes less than 2 seconds.   Neurological:      General: No focal deficit present.      Mental Status: He is alert and oriented to person, place, and time.   Psychiatric:         Behavior: Behavior normal.         Thought Content: Thought content normal.         Judgment: Judgment normal.         Vital Signs  ED Triage Vitals [01/12/24 1907]   Temperature Pulse Respirations Blood Pressure SpO2   (!) 97.1 °F (36.2 °C) 76 20 139/72 99 %      Temp Source Heart Rate Source Patient Position - Orthostatic VS BP Location FiO2 (%)   Tympanic Monitor Sitting Right arm --      Pain Score       9           Vitals:    01/12/24 1907 01/12/24 2310   BP: 139/72 133/74   Pulse: 76 84   Patient Position - Orthostatic VS: Sitting Lying         Visual Acuity      ED Medications  Medications   iohexol (OMNIPAQUE) 350 MG/ML injection (MULTI-DOSE) 100 mL (100 mL Intravenous Given 1/12/24 2143)   ciprofloxacin (CIPRO) tablet 500 mg (500 mg Oral Given 1/12/24 2312)       Diagnostic Studies  Results Reviewed       Procedure Component Value Units Date/Time    RBC Morphology Reflex Test [[id number]] Collected: 01/12/24 2033    Lab Status: Final result Specimen: Blood from Arm, Right Updated: 01/12/24 2201    Urine Microscopic [[id number]]  (Normal) Collected: 01/12/24 2034    Lab Status: Final result Specimen: Urine, Clean Catch Updated: 01/12/24 2132     RBC, UA 1-2 /hpf      WBC, UA 0-1 /hpf      Epithelial Cells Occasional /hpf      Bacteria, UA Occasional /hpf     CBC and differential  [[id number]]  (Abnormal) Collected: 01/12/24 2033    Lab Status: Final result Specimen: Blood from Arm, Right Updated: 01/12/24 2115     WBC 17.00 Thousand/uL      RBC 5.99 Million/uL      Hemoglobin 16.7 g/dL      Hematocrit 50.4 %      MCV 84 fL      MCH 27.9 pg      MCHC 33.1 g/dL      RDW 14.0 %      MPV 12.5 fL      Platelets 126 Thousands/uL     Narrative:      This is an appended report.  These results have been appended to a previously verified report.    Manual Differential(PHLEBS Do Not Order) [[id number]]  (Abnormal) Collected: 01/12/24 2033    Lab Status: Final result Specimen: Blood from Arm, Right Updated: 01/12/24 2115     Segmented % 91 %      Bands % 2 %      Lymphocytes % 2 %      Monocytes % 3 %      Eosinophils, % 0 %      Basophils % 2 %      Absolute Neutrophils 15.81 Thousand/uL      Lymphocytes Absolute 0.34 Thousand/uL      Monocytes Absolute 0.51 Thousand/uL      Eosinophils Absolute 0.00 Thousand/uL      Basophils Absolute 0.34 Thousand/uL      Total Counted --     RBC Morphology Present     Platelet Estimate Borderline     Anisocytosis Present     Ovalocytes Present    Comprehensive metabolic panel [[id number]]  (Abnormal) Collected: 01/12/24 2033    Lab Status: Final result Specimen: Blood from Arm, Right Updated: 01/12/24 2054     Sodium 137 mmol/L      Potassium 4.4 mmol/L      Chloride 104 mmol/L      CO2 26 mmol/L      ANION GAP 7 mmol/L      BUN 28 mg/dL      Creatinine 1.21 mg/dL      Glucose 151 mg/dL      Calcium 9.4 mg/dL      AST 28 U/L      ALT 45 U/L      Alkaline Phosphatase 59 U/L      Total Protein 7.8 g/dL      Albumin 4.6 g/dL      Total Bilirubin 0.65 mg/dL      eGFR 65 ml/min/1.73sq m     Narrative:      National Kidney Disease Foundation guidelines for Chronic Kidney Disease (CKD):     Stage 1 with normal or high GFR (GFR > 90 mL/min/1.73 square meters)    Stage 2 Mild CKD (GFR = 60-89 mL/min/1.73 square meters)    Stage 3A Moderate CKD (GFR = 45-59 mL/min/1.73 square  meters)    Stage 3B Moderate CKD (GFR = 30-44 mL/min/1.73 square meters)    Stage 4 Severe CKD (GFR = 15-29 mL/min/1.73 square meters)    Stage 5 End Stage CKD (GFR <15 mL/min/1.73 square meters)  Note: GFR calculation is accurate only with a steady state creatinine    UA w Reflex to Microscopic w Reflex to Culture [[id number]]  (Abnormal) Collected: 01/12/24 2034    Lab Status: Final result Specimen: Urine, Clean Catch Updated: 01/12/24 2046     Color, UA Yellow     Clarity, UA Clear     Specific Gravity, UA >=1.030     pH, UA 5.5     Leukocytes, UA Negative     Nitrite, UA Negative     Protein, UA 30 (1+) mg/dl      Glucose, UA Negative mg/dl      Ketones, UA Negative mg/dl      Urobilinogen, UA 0.2 E.U./dl      Bilirubin, UA Negative     Occult Blood, UA Negative                   CT abdomen pelvis with contrast   Final Result by Han Grimaldo MD (01/12 2208)      Mild circumferential rectal wall thickening with surrounding inflammatory stranding suspicious for an infectious or inflammatory proctitis. Clinical correlation is recommended.      No large or small bowel obstruction. No free air or free fluid.      No hydronephrosis.      Hepatic steatosis.            Workstation performed: [id number]                    Procedures  Procedures         ED Course                                             Medical Decision Making  Amount and/or Complexity of Data Reviewed  Labs: ordered.  Radiology: ordered.    Risk  Prescription drug management.             Disposition  Final diagnoses:   Proctitis   Urinary retention     Time reflects when diagnosis was documented in both MDM as applicable and the Disposition within this note       Time User Action Codes Description Comment    1/12/2024 10:54 PM Gilmar John [K62.89] Proctitis     1/12/2024 10:54 PM Gilmar John [R33.9] Urinary retention           ED Disposition       ED Disposition   Discharge    Condition   Stable    Date/Time   Fri Jan  12, 2024 10:54 PM    Comment   Eduar Arroyo discharge to home/self care.                   Follow-up Information       Follow up With Specialties Details Why Contact Info Additional Information    MARCY Daly Nurse Practitioner Schedule an appointment as soon as possible for a visit in 1 day for follow up [facility]  [address] [facility] [address]  [phone]       David Feliciano MD Urology Schedule an appointment as soon as possible for a visit in 3 days for follow up [address] [place] [address]  [phone]       [facility] Schedule an appointment as soon as possible for a visit in 3 days for follow up [address] [place] [id number] [phone] [facility] [place], [address] [place] [address], [address], [id number] [phone]            Patient's Medications   Discharge Prescriptions    CIPROFLOXACIN (CIPRO) 500 MG TABLET    Take 1 tablet (500 mg total) by mouth 2 (two) times a day for 7 days       Start Date: 1/12/2024 End Date: 1/19/2024       Order Dose: 500 mg       Quantity: 14 tablet    Refills: 0       No discharge procedures on file.    PDMP Review         Value Time User    PDMP Reviewed  Yes 7/9/2020  5:23 PM Belinda Hammond MD            ED Provider  Electronically Signed by           Details   folic acid (FOLVITE) 1 mg tablet Take 1 mg by mouth every morning, Historical Med      lisinopril (ZESTRIL) 10 mg tablet Take 10 mg by mouth daily, Historical Med      methotrexate 2.5 mg tablet Take 10 mg by mouth once a week 10 mg on Friday, Historical Med             No discharge procedures on file.    PDMP Review         Value Time User    PDMP Reviewed  Yes 7/9/2020  5:23 PM Belinda Hammond MD            ED Provider  Electronically Signed by             Gilmar John DO  01/15/24 1800

## 2025-02-20 NOTE — TELEPHONE ENCOUNTER
----- Message from Aden Bojorquez LPN sent at 3/2/9990 10:02 AM EDT -----  Patient aware via [facility]  Educated  Colon 10 years  Please call patient to schedule f/u to flip with Dr Felisha Rutledge   Thank you
Called and scheduled 
Left message for patient to call and schedule  Will call in one week if he doesn't return call to schedule f/u with Dr Dorena Osgood 
36.8

## (undated) DEVICE — LASER FIBER HOLMIUM 272MICRON

## (undated) DEVICE — SEAL ADJUST BIOPSY PORT Y ADAPTOR

## (undated) DEVICE — URETERAL CATH 5 FR

## (undated) DEVICE — SHEATH URETERAL ACCESS 10/12FR 45CM PROXIS

## (undated) DEVICE — GLOVE SRG BIOGEL 8

## (undated) DEVICE — LUBRICANT SURGILUBE TUBE 4 OZ  FLIP TOP

## (undated) DEVICE — CYSTOSCOPY PACK: Brand: CONVERTORS

## (undated) DEVICE — POUCH UR CATCHER STERILE

## (undated) DEVICE — SPONGE STICK WITH PVP-I: Brand: KENDALL

## (undated) DEVICE — CYSTO TUBING TUR Y IRRIGATION

## (undated) DEVICE — FABRIC REINFORCED, SURGICAL GOWN, XL: Brand: CONVERTORS

## (undated) DEVICE — GUIDEWIRE STRGHT TIP 0.038 IN SOLO PLUS

## (undated) DEVICE — RADIOLOGY STERILE LABELS: Brand: CENTURION

## (undated) DEVICE — PROVE COVER: Brand: UNBRANDED

## (undated) DEVICE — NGAGE, NITINOL STONE EXTRACTOR: Brand: NGAGE

## (undated) DEVICE — SHEATH URETERAL ACCESS 10/12FR 35CM PROXIS